# Patient Record
Sex: MALE | Race: WHITE | NOT HISPANIC OR LATINO | Employment: OTHER | URBAN - METROPOLITAN AREA
[De-identification: names, ages, dates, MRNs, and addresses within clinical notes are randomized per-mention and may not be internally consistent; named-entity substitution may affect disease eponyms.]

---

## 2022-12-30 ENCOUNTER — APPOINTMENT (OUTPATIENT)
Dept: CARDIOLOGY | Facility: HOSPITAL | Age: 77
DRG: 246 | End: 2022-12-30
Payer: MEDICARE

## 2022-12-30 ENCOUNTER — APPOINTMENT (OUTPATIENT)
Dept: GENERAL RADIOLOGY | Facility: HOSPITAL | Age: 77
DRG: 246 | End: 2022-12-30
Payer: MEDICARE

## 2022-12-30 ENCOUNTER — HOSPITAL ENCOUNTER (INPATIENT)
Facility: HOSPITAL | Age: 77
LOS: 7 days | Discharge: HOME HEALTH | DRG: 246 | End: 2023-01-06
Attending: INTERNAL MEDICINE | Admitting: INTERNAL MEDICINE
Payer: MEDICARE

## 2022-12-30 DIAGNOSIS — I49.01 VF (VENTRICULAR FIBRILLATION): ICD-10-CM

## 2022-12-30 DIAGNOSIS — I46.9 CARDIAC ARREST: ICD-10-CM

## 2022-12-30 DIAGNOSIS — N17.9 AKI (ACUTE KIDNEY INJURY): ICD-10-CM

## 2022-12-30 DIAGNOSIS — J96.11 CHRONIC HYPOXEMIC RESPIRATORY FAILURE: ICD-10-CM

## 2022-12-30 DIAGNOSIS — I21.19 ACUTE ST ELEVATION MYOCARDIAL INFARCTION (STEMI) OF INFERIOR WALL: ICD-10-CM

## 2022-12-30 DIAGNOSIS — I44.2 COMPLETE HEART BLOCK: ICD-10-CM

## 2022-12-30 DIAGNOSIS — I21.3 ST ELEVATION MYOCARDIAL INFARCTION (STEMI), UNSPECIFIED ARTERY: Primary | ICD-10-CM

## 2022-12-30 DIAGNOSIS — R73.03 PREDIABETES: ICD-10-CM

## 2022-12-30 DIAGNOSIS — Z72.0 TOBACCO USE: ICD-10-CM

## 2022-12-30 DIAGNOSIS — I50.21 ACUTE SYSTOLIC HEART FAILURE: ICD-10-CM

## 2022-12-30 LAB
ACT BLD: 215 SECONDS (ref 82–152)
ACT BLD: 342 SECONDS (ref 82–152)
ALBUMIN SERPL-MCNC: 3.2 G/DL (ref 3.5–5.2)
ALBUMIN/GLOB SERPL: 1 G/DL
ALP SERPL-CCNC: 200 U/L (ref 39–117)
ALT SERPL W P-5'-P-CCNC: 51 U/L (ref 1–41)
ANION GAP SERPL CALCULATED.3IONS-SCNC: 11 MMOL/L (ref 5–15)
AST SERPL-CCNC: 167 U/L (ref 1–40)
BASOPHILS # BLD AUTO: 0.04 10*3/MM3 (ref 0–0.2)
BASOPHILS NFR BLD AUTO: 0.3 % (ref 0–1.5)
BH CV ECHO MEAS - AO MAX PG: 5.2 MMHG
BH CV ECHO MEAS - AO MEAN PG: 2 MMHG
BH CV ECHO MEAS - AO ROOT DIAM: 3.3 CM
BH CV ECHO MEAS - AO V2 MAX: 114 CM/SEC
BH CV ECHO MEAS - AO V2 VTI: 21.9 CM
BH CV ECHO MEAS - AVA(I,D): 1.64 CM2
BH CV ECHO MEAS - EDV(CUBED): 97.3 ML
BH CV ECHO MEAS - EDV(MOD-SP2): 68.3 ML
BH CV ECHO MEAS - EDV(MOD-SP4): 93.4 ML
BH CV ECHO MEAS - EF(MOD-BP): 32.1 %
BH CV ECHO MEAS - EF(MOD-SP2): 29.7 %
BH CV ECHO MEAS - EF(MOD-SP4): 35.1 %
BH CV ECHO MEAS - ESV(CUBED): 54.9 ML
BH CV ECHO MEAS - ESV(MOD-SP2): 48 ML
BH CV ECHO MEAS - ESV(MOD-SP4): 60.6 ML
BH CV ECHO MEAS - FS: 17.4 %
BH CV ECHO MEAS - IVS/LVPW: 1 CM
BH CV ECHO MEAS - IVSD: 0.7 CM
BH CV ECHO MEAS - LA DIMENSION: 3.2 CM
BH CV ECHO MEAS - LAT PEAK E' VEL: 12.6 CM/SEC
BH CV ECHO MEAS - LV MASS(C)D: 99.3 GRAMS
BH CV ECHO MEAS - LV MAX PG: 1.21 MMHG
BH CV ECHO MEAS - LV MEAN PG: 0 MMHG
BH CV ECHO MEAS - LV V1 MAX: 55.1 CM/SEC
BH CV ECHO MEAS - LV V1 VTI: 11.4 CM
BH CV ECHO MEAS - LVIDD: 4.6 CM
BH CV ECHO MEAS - LVIDS: 3.8 CM
BH CV ECHO MEAS - LVOT AREA: 3.1 CM2
BH CV ECHO MEAS - LVOT DIAM: 2 CM
BH CV ECHO MEAS - LVPWD: 0.7 CM
BH CV ECHO MEAS - MED PEAK E' VEL: 7.3 CM/SEC
BH CV ECHO MEAS - MR MAX PG: 26.8 MMHG
BH CV ECHO MEAS - MR MAX VEL: 259 CM/SEC
BH CV ECHO MEAS - MR MEAN PG: 19 MMHG
BH CV ECHO MEAS - MR MEAN VEL: 211 CM/SEC
BH CV ECHO MEAS - MR VTI: 73.1 CM
BH CV ECHO MEAS - MV A MAX VEL: 77.6 CM/SEC
BH CV ECHO MEAS - MV DEC SLOPE: 481 CM/SEC2
BH CV ECHO MEAS - MV DEC TIME: 0.21 MSEC
BH CV ECHO MEAS - MV E MAX VEL: 68.1 CM/SEC
BH CV ECHO MEAS - MV E/A: 0.88
BH CV ECHO MEAS - MV P1/2T: 63.3 MSEC
BH CV ECHO MEAS - MVA(P1/2T): 3.5 CM2
BH CV ECHO MEAS - PA ACC TIME: 0.15 SEC
BH CV ECHO MEAS - PA PR(ACCEL): 11 MMHG
BH CV ECHO MEAS - RAP SYSTOLE: 8 MMHG
BH CV ECHO MEAS - RVSP: 41 MMHG
BH CV ECHO MEAS - SV(LVOT): 35.8 ML
BH CV ECHO MEAS - SV(MOD-SP2): 20.3 ML
BH CV ECHO MEAS - SV(MOD-SP4): 32.8 ML
BH CV ECHO MEAS - TAPSE (>1.6): 1.6 CM
BH CV ECHO MEAS - TR MAX PG: 32.9 MMHG
BH CV ECHO MEAS - TR MAX VEL: 287 CM/SEC
BH CV ECHO MEASUREMENTS AVERAGE E/E' RATIO: 6.84
BH CV VAS BP LEFT ARM: NORMAL MMHG
BH CV XLRA - RV BASE: 4.4 CM
BH CV XLRA - RV LENGTH: 8.1 CM
BH CV XLRA - RV MID: 3.4 CM
BH CV XLRA - TDI S': 10.6 CM/SEC
BH CV XLRA MEAS LEFT DIST CCA EDV: 19.3 CM/SEC
BH CV XLRA MEAS LEFT DIST CCA PSV: 68.3 CM/SEC
BH CV XLRA MEAS LEFT DIST ICA EDV: 34.8 CM/SEC
BH CV XLRA MEAS LEFT DIST ICA PSV: 92.6 CM/SEC
BH CV XLRA MEAS LEFT ICA/CCA RATIO: 1.1
BH CV XLRA MEAS LEFT MID CCA EDV: 20.5 CM/SEC
BH CV XLRA MEAS LEFT MID CCA PSV: 80.1 CM/SEC
BH CV XLRA MEAS LEFT MID ICA EDV: 29.8 CM/SEC
BH CV XLRA MEAS LEFT MID ICA PSV: 88.2 CM/SEC
BH CV XLRA MEAS LEFT PROX CCA EDV: 28.6 CM/SEC
BH CV XLRA MEAS LEFT PROX CCA PSV: 103 CM/SEC
BH CV XLRA MEAS LEFT PROX ECA PSV: 94.4 CM/SEC
BH CV XLRA MEAS LEFT PROX ICA EDV: 14.3 CM/SEC
BH CV XLRA MEAS LEFT PROX ICA PSV: 36 CM/SEC
BH CV XLRA MEAS LEFT PROX SCLA PSV: 100 CM/SEC
BH CV XLRA MEAS LEFT VERTEBRAL A PSV: 33.2 CM/SEC
BH CV XLRA MEAS RIGHT DIST CCA EDV: 24.2 CM/SEC
BH CV XLRA MEAS RIGHT DIST CCA PSV: 67.1 CM/SEC
BH CV XLRA MEAS RIGHT DIST ICA EDV: 29.2 CM/SEC
BH CV XLRA MEAS RIGHT DIST ICA PSV: 73.3 CM/SEC
BH CV XLRA MEAS RIGHT ICA/CCA RATIO: 1.17
BH CV XLRA MEAS RIGHT MID CCA EDV: 20.5 CM/SEC
BH CV XLRA MEAS RIGHT MID CCA PSV: 70.2 CM/SEC
BH CV XLRA MEAS RIGHT MID ICA EDV: 25.5 CM/SEC
BH CV XLRA MEAS RIGHT MID ICA PSV: 82 CM/SEC
BH CV XLRA MEAS RIGHT PROX CCA EDV: 15.5 CM/SEC
BH CV XLRA MEAS RIGHT PROX CCA PSV: 72.7 CM/SEC
BH CV XLRA MEAS RIGHT PROX ECA PSV: 80.8 CM/SEC
BH CV XLRA MEAS RIGHT PROX ICA EDV: 12.4 CM/SEC
BH CV XLRA MEAS RIGHT PROX ICA PSV: 50.9 CM/SEC
BH CV XLRA MEAS RIGHT PROX SCLA PSV: 92.6 CM/SEC
BH CV XLRA MEAS RIGHT VERTEBRAL A PSV: 44.1 CM/SEC
BILIRUB SERPL-MCNC: 0.5 MG/DL (ref 0–1.2)
BUN SERPL-MCNC: 15 MG/DL (ref 8–23)
BUN/CREAT SERPL: 10.7 (ref 7–25)
CALCIUM SPEC-SCNC: 8.5 MG/DL (ref 8.6–10.5)
CATH EF ESTIMATED: 40 %
CHLORIDE SERPL-SCNC: 103 MMOL/L (ref 98–107)
CO2 SERPL-SCNC: 23 MMOL/L (ref 22–29)
CREAT BLDA-MCNC: 1.6 MG/DL (ref 0.6–1.3)
CREAT SERPL-MCNC: 1.4 MG/DL (ref 0.76–1.27)
DEPRECATED RDW RBC AUTO: 52 FL (ref 37–54)
EGFRCR SERPLBLD CKD-EPI 2021: 52.1 ML/MIN/1.73
EOSINOPHIL # BLD AUTO: 0.03 10*3/MM3 (ref 0–0.4)
EOSINOPHIL NFR BLD AUTO: 0.3 % (ref 0.3–6.2)
ERYTHROCYTE [DISTWIDTH] IN BLOOD BY AUTOMATED COUNT: 14.1 % (ref 12.3–15.4)
GLOBULIN UR ELPH-MCNC: 3.2 GM/DL
GLUCOSE BLDC GLUCOMTR-MCNC: 115 MG/DL (ref 70–130)
GLUCOSE BLDC GLUCOMTR-MCNC: 202 MG/DL (ref 70–130)
GLUCOSE SERPL-MCNC: 223 MG/DL (ref 65–99)
HBA1C MFR BLD: 6.1 % (ref 4.8–5.6)
HCT VFR BLD AUTO: 43.7 % (ref 37.5–51)
HGB BLD-MCNC: 13.9 G/DL (ref 13–17.7)
IMM GRANULOCYTES # BLD AUTO: 0.21 10*3/MM3 (ref 0–0.05)
IMM GRANULOCYTES NFR BLD AUTO: 1.8 % (ref 0–0.5)
IVRT: 85 MSEC
LEFT ARM BP: NORMAL MMHG
LEFT ATRIUM VOLUME INDEX: 25.4 ML/M2
LEFT ATRIUM VOLUME: 41.9 ML
LYMPHOCYTES # BLD AUTO: 1.42 10*3/MM3 (ref 0.7–3.1)
LYMPHOCYTES NFR BLD AUTO: 11.9 % (ref 19.6–45.3)
MAGNESIUM SERPL-MCNC: 1.7 MG/DL (ref 1.6–2.4)
MAXIMAL PREDICTED HEART RATE: 144 BPM
MAXIMAL PREDICTED HEART RATE: 144 BPM
MCH RBC QN AUTO: 31.7 PG (ref 26.6–33)
MCHC RBC AUTO-ENTMCNC: 31.8 G/DL (ref 31.5–35.7)
MCV RBC AUTO: 99.5 FL (ref 79–97)
MONOCYTES # BLD AUTO: 0.65 10*3/MM3 (ref 0.1–0.9)
MONOCYTES NFR BLD AUTO: 5.4 % (ref 5–12)
NEUTROPHILS NFR BLD AUTO: 80.3 % (ref 42.7–76)
NEUTROPHILS NFR BLD AUTO: 9.59 10*3/MM3 (ref 1.7–7)
NRBC BLD AUTO-RTO: 0 /100 WBC (ref 0–0.2)
NT-PROBNP SERPL-MCNC: 480.4 PG/ML (ref 0–1800)
PHOSPHATE SERPL-MCNC: 1.4 MG/DL (ref 2.5–4.5)
PLATELET # BLD AUTO: 577 10*3/MM3 (ref 140–450)
PMV BLD AUTO: 9.2 FL (ref 6–12)
POTASSIUM SERPL-SCNC: 4.2 MMOL/L (ref 3.5–5.2)
PROT SERPL-MCNC: 6.4 G/DL (ref 6–8.5)
RBC # BLD AUTO: 4.39 10*6/MM3 (ref 4.14–5.8)
SODIUM SERPL-SCNC: 137 MMOL/L (ref 136–145)
STRESS TARGET HR: 122 BPM
STRESS TARGET HR: 122 BPM
TROPONIN T SERPL-MCNC: 2.1 NG/ML (ref 0–0.03)
TSH SERPL DL<=0.05 MIU/L-ACNC: 1.4 UIU/ML (ref 0.27–4.2)
WBC NRBC COR # BLD: 11.94 10*3/MM3 (ref 3.4–10.8)

## 2022-12-30 PROCEDURE — 83735 ASSAY OF MAGNESIUM: CPT | Performed by: INTERNAL MEDICINE

## 2022-12-30 PROCEDURE — 25010000002 MIDAZOLAM PER 1 MG: Performed by: INTERNAL MEDICINE

## 2022-12-30 PROCEDURE — 25010000002 PHENYLEPHRINE 10 MG/ML SOLUTION: Performed by: INTERNAL MEDICINE

## 2022-12-30 PROCEDURE — 71045 X-RAY EXAM CHEST 1 VIEW: CPT

## 2022-12-30 PROCEDURE — 25010000002 FENTANYL CITRATE (PF) 50 MCG/ML SOLUTION: Performed by: INTERNAL MEDICINE

## 2022-12-30 PROCEDURE — 92978 ENDOLUMINL IVUS OCT C 1ST: CPT | Performed by: INTERNAL MEDICINE

## 2022-12-30 PROCEDURE — 93799 UNLISTED CV SVC/PROCEDURE: CPT | Performed by: INTERNAL MEDICINE

## 2022-12-30 PROCEDURE — 0 IOPAMIDOL PER 1 ML: Performed by: INTERNAL MEDICINE

## 2022-12-30 PROCEDURE — 84443 ASSAY THYROID STIM HORMONE: CPT | Performed by: INTERNAL MEDICINE

## 2022-12-30 PROCEDURE — 0 MAGNESIUM SULFATE 4 GM/100ML SOLUTION: Performed by: NURSE PRACTITIONER

## 2022-12-30 PROCEDURE — C1725 CATH, TRANSLUMIN NON-LASER: HCPCS | Performed by: INTERNAL MEDICINE

## 2022-12-30 PROCEDURE — C9460 INJECTION, CANGRELOR: HCPCS | Performed by: INTERNAL MEDICINE

## 2022-12-30 PROCEDURE — 83880 ASSAY OF NATRIURETIC PEPTIDE: CPT | Performed by: INTERNAL MEDICINE

## 2022-12-30 PROCEDURE — C1769 GUIDE WIRE: HCPCS | Performed by: INTERNAL MEDICINE

## 2022-12-30 PROCEDURE — 5A2204Z RESTORATION OF CARDIAC RHYTHM, SINGLE: ICD-10-PCS | Performed by: INTERNAL MEDICINE

## 2022-12-30 PROCEDURE — 85025 COMPLETE CBC W/AUTO DIFF WBC: CPT | Performed by: INTERNAL MEDICINE

## 2022-12-30 PROCEDURE — 92941 PRQ TRLML REVSC TOT OCCL AMI: CPT | Performed by: INTERNAL MEDICINE

## 2022-12-30 PROCEDURE — 4A023N7 MEASUREMENT OF CARDIAC SAMPLING AND PRESSURE, LEFT HEART, PERCUTANEOUS APPROACH: ICD-10-PCS | Performed by: INTERNAL MEDICINE

## 2022-12-30 PROCEDURE — B2111ZZ FLUOROSCOPY OF MULTIPLE CORONARY ARTERIES USING LOW OSMOLAR CONTRAST: ICD-10-PCS | Performed by: INTERNAL MEDICINE

## 2022-12-30 PROCEDURE — 82565 ASSAY OF CREATININE: CPT

## 2022-12-30 PROCEDURE — 93005 ELECTROCARDIOGRAM TRACING: CPT | Performed by: INTERNAL MEDICINE

## 2022-12-30 PROCEDURE — 25010000002 AMIODARONE IN DEXTROSE 5% 150-4.21 MG/100ML-% SOLUTION: Performed by: INTERNAL MEDICINE

## 2022-12-30 PROCEDURE — C9606 PERC D-E COR REVASC W AMI S: HCPCS | Performed by: INTERNAL MEDICINE

## 2022-12-30 PROCEDURE — C1874 STENT, COATED/COV W/DEL SYS: HCPCS | Performed by: INTERNAL MEDICINE

## 2022-12-30 PROCEDURE — B240ZZ3 ULTRASONOGRAPHY OF SINGLE CORONARY ARTERY, INTRAVASCULAR: ICD-10-PCS | Performed by: INTERNAL MEDICINE

## 2022-12-30 PROCEDURE — 84484 ASSAY OF TROPONIN QUANT: CPT | Performed by: INTERNAL MEDICINE

## 2022-12-30 PROCEDURE — 93005 ELECTROCARDIOGRAM TRACING: CPT | Performed by: NURSE PRACTITIONER

## 2022-12-30 PROCEDURE — B2151ZZ FLUOROSCOPY OF LEFT HEART USING LOW OSMOLAR CONTRAST: ICD-10-PCS | Performed by: INTERNAL MEDICINE

## 2022-12-30 PROCEDURE — 82962 GLUCOSE BLOOD TEST: CPT

## 2022-12-30 PROCEDURE — 93458 L HRT ARTERY/VENTRICLE ANGIO: CPT | Performed by: INTERNAL MEDICINE

## 2022-12-30 PROCEDURE — 93880 EXTRACRANIAL BILAT STUDY: CPT

## 2022-12-30 PROCEDURE — 25010000002 HEPARIN (PORCINE) PER 1000 UNITS: Performed by: INTERNAL MEDICINE

## 2022-12-30 PROCEDURE — 99232 SBSQ HOSP IP/OBS MODERATE 35: CPT | Performed by: NURSE PRACTITIONER

## 2022-12-30 PROCEDURE — 80053 COMPREHEN METABOLIC PANEL: CPT | Performed by: INTERNAL MEDICINE

## 2022-12-30 PROCEDURE — 027034Z DILATION OF CORONARY ARTERY, ONE ARTERY WITH DRUG-ELUTING INTRALUMINAL DEVICE, PERCUTANEOUS APPROACH: ICD-10-PCS | Performed by: INTERNAL MEDICINE

## 2022-12-30 PROCEDURE — C1753 CATH, INTRAVAS ULTRASOUND: HCPCS | Performed by: INTERNAL MEDICINE

## 2022-12-30 PROCEDURE — 93010 ELECTROCARDIOGRAM REPORT: CPT | Performed by: INTERNAL MEDICINE

## 2022-12-30 PROCEDURE — 99223 1ST HOSP IP/OBS HIGH 75: CPT | Performed by: INTERNAL MEDICINE

## 2022-12-30 PROCEDURE — C1887 CATHETER, GUIDING: HCPCS | Performed by: INTERNAL MEDICINE

## 2022-12-30 PROCEDURE — 25010000002 DOPAMINE PER 40 MG: Performed by: INTERNAL MEDICINE

## 2022-12-30 PROCEDURE — 93306 TTE W/DOPPLER COMPLETE: CPT

## 2022-12-30 PROCEDURE — 93306 TTE W/DOPPLER COMPLETE: CPT | Performed by: INTERNAL MEDICINE

## 2022-12-30 PROCEDURE — 93571 IV DOP VEL&/PRESS C FLO 1ST: CPT | Performed by: INTERNAL MEDICINE

## 2022-12-30 PROCEDURE — 85347 COAGULATION TIME ACTIVATED: CPT

## 2022-12-30 PROCEDURE — 25010000002 CANGRELOR TETRASODIUM 50 MG RECONSTITUTED SOLUTION 1 EACH VIAL: Performed by: INTERNAL MEDICINE

## 2022-12-30 PROCEDURE — C1894 INTRO/SHEATH, NON-LASER: HCPCS | Performed by: INTERNAL MEDICINE

## 2022-12-30 PROCEDURE — 84100 ASSAY OF PHOSPHORUS: CPT | Performed by: NURSE PRACTITIONER

## 2022-12-30 PROCEDURE — 93880 EXTRACRANIAL BILAT STUDY: CPT | Performed by: INTERNAL MEDICINE

## 2022-12-30 PROCEDURE — 83036 HEMOGLOBIN GLYCOSYLATED A1C: CPT | Performed by: NURSE PRACTITIONER

## 2022-12-30 PROCEDURE — 25010000002 AMIODARONE IN DEXTROSE 5% 360-4.14 MG/200ML-% SOLUTION: Performed by: INTERNAL MEDICINE

## 2022-12-30 DEVICE — XIENCE SKYPOINT™ EVEROLIMUS ELUTING CORONARY STENT SYSTEM 3.50 MM X 38 MM / RAPID-EXCHANGE
Type: IMPLANTABLE DEVICE | Status: FUNCTIONAL
Brand: XIENCE SKYPOINT™

## 2022-12-30 RX ORDER — HEPARIN SODIUM 1000 [USP'U]/ML
INJECTION, SOLUTION INTRAVENOUS; SUBCUTANEOUS
Status: DISCONTINUED | OUTPATIENT
Start: 2022-12-30 | End: 2022-12-30 | Stop reason: HOSPADM

## 2022-12-30 RX ORDER — ASPIRIN 81 MG/1
81 TABLET ORAL DAILY
Status: DISCONTINUED | OUTPATIENT
Start: 2022-12-31 | End: 2023-01-06 | Stop reason: HOSPADM

## 2022-12-30 RX ORDER — NICOTINE POLACRILEX 4 MG
15 LOZENGE BUCCAL
Status: DISCONTINUED | OUTPATIENT
Start: 2022-12-30 | End: 2023-01-05

## 2022-12-30 RX ORDER — DOPAMINE HYDROCHLORIDE 160 MG/100ML
INJECTION, SOLUTION INTRAVENOUS
Status: COMPLETED | OUTPATIENT
Start: 2022-12-30 | End: 2022-12-30

## 2022-12-30 RX ORDER — MIDAZOLAM HYDROCHLORIDE 1 MG/ML
INJECTION INTRAMUSCULAR; INTRAVENOUS
Status: DISCONTINUED | OUTPATIENT
Start: 2022-12-30 | End: 2022-12-30 | Stop reason: HOSPADM

## 2022-12-30 RX ORDER — MAGNESIUM SULFATE HEPTAHYDRATE 40 MG/ML
2 INJECTION, SOLUTION INTRAVENOUS AS NEEDED
Status: DISCONTINUED | OUTPATIENT
Start: 2022-12-30 | End: 2023-01-06 | Stop reason: HOSPADM

## 2022-12-30 RX ORDER — PHENYLEPHRINE HYDROCHLORIDE 10 MG/ML
INJECTION INTRAVENOUS
Status: DISCONTINUED | OUTPATIENT
Start: 2022-12-30 | End: 2022-12-30 | Stop reason: HOSPADM

## 2022-12-30 RX ORDER — ROSUVASTATIN CALCIUM 20 MG/1
20 TABLET, COATED ORAL NIGHTLY
Status: DISCONTINUED | OUTPATIENT
Start: 2022-12-30 | End: 2023-01-06 | Stop reason: HOSPADM

## 2022-12-30 RX ORDER — NICARDIPINE HCL-0.9% SOD CHLOR 1 MG/10 ML
SYRINGE (ML) INTRAVENOUS
Status: DISCONTINUED | OUTPATIENT
Start: 2022-12-30 | End: 2022-12-30 | Stop reason: HOSPADM

## 2022-12-30 RX ORDER — INSULIN LISPRO 100 [IU]/ML
0-9 INJECTION, SOLUTION INTRAVENOUS; SUBCUTANEOUS
Status: DISCONTINUED | OUTPATIENT
Start: 2022-12-30 | End: 2023-01-05

## 2022-12-30 RX ORDER — DEXTROSE MONOHYDRATE 25 G/50ML
25 INJECTION, SOLUTION INTRAVENOUS
Status: DISCONTINUED | OUTPATIENT
Start: 2022-12-30 | End: 2023-01-05

## 2022-12-30 RX ORDER — FENTANYL CITRATE 50 UG/ML
INJECTION, SOLUTION INTRAMUSCULAR; INTRAVENOUS
Status: DISCONTINUED | OUTPATIENT
Start: 2022-12-30 | End: 2022-12-30 | Stop reason: HOSPADM

## 2022-12-30 RX ORDER — MAGNESIUM SULFATE HEPTAHYDRATE 40 MG/ML
4 INJECTION, SOLUTION INTRAVENOUS AS NEEDED
Status: DISCONTINUED | OUTPATIENT
Start: 2022-12-30 | End: 2023-01-06 | Stop reason: HOSPADM

## 2022-12-30 RX ORDER — ONDANSETRON 2 MG/ML
4 INJECTION INTRAMUSCULAR; INTRAVENOUS EVERY 6 HOURS PRN
Status: DISCONTINUED | OUTPATIENT
Start: 2022-12-30 | End: 2023-01-06 | Stop reason: HOSPADM

## 2022-12-30 RX ORDER — ACETAMINOPHEN 325 MG/1
650 TABLET ORAL EVERY 4 HOURS PRN
Status: DISCONTINUED | OUTPATIENT
Start: 2022-12-30 | End: 2023-01-06 | Stop reason: HOSPADM

## 2022-12-30 RX ORDER — LIDOCAINE HYDROCHLORIDE 10 MG/ML
INJECTION, SOLUTION EPIDURAL; INFILTRATION; INTRACAUDAL; PERINEURAL
Status: DISCONTINUED | OUTPATIENT
Start: 2022-12-30 | End: 2022-12-30 | Stop reason: HOSPADM

## 2022-12-30 RX ORDER — NICOTINE 21 MG/24HR
1 PATCH, TRANSDERMAL 24 HOURS TRANSDERMAL
Status: DISCONTINUED | OUTPATIENT
Start: 2022-12-30 | End: 2023-01-06 | Stop reason: HOSPADM

## 2022-12-30 RX ORDER — SODIUM CHLORIDE 9 MG/ML
1 INJECTION, SOLUTION INTRAVENOUS CONTINUOUS
Status: ACTIVE | OUTPATIENT
Start: 2022-12-30 | End: 2022-12-30

## 2022-12-30 RX ORDER — PRASUGREL 10 MG/1
60 TABLET, FILM COATED ORAL ONCE
Status: COMPLETED | OUTPATIENT
Start: 2022-12-30 | End: 2022-12-30

## 2022-12-30 RX ORDER — DOPAMINE HYDROCHLORIDE 160 MG/100ML
2-20 INJECTION, SOLUTION INTRAVENOUS
Status: DISCONTINUED | OUTPATIENT
Start: 2022-12-30 | End: 2023-01-04

## 2022-12-30 RX ORDER — PANTOPRAZOLE SODIUM 40 MG/1
40 TABLET, DELAYED RELEASE ORAL
Status: DISCONTINUED | OUTPATIENT
Start: 2022-12-31 | End: 2023-01-06 | Stop reason: HOSPADM

## 2022-12-30 RX ORDER — PRASUGREL 10 MG/1
5 TABLET, FILM COATED ORAL DAILY
Status: DISCONTINUED | OUTPATIENT
Start: 2022-12-31 | End: 2023-01-06 | Stop reason: HOSPADM

## 2022-12-30 RX ADMIN — SODIUM CHLORIDE 1 ML/KG/HR: 9 INJECTION, SOLUTION INTRAVENOUS at 16:05

## 2022-12-30 RX ADMIN — Medication 1 PATCH: at 20:14

## 2022-12-30 RX ADMIN — MAGNESIUM SULFATE HEPTAHYDRATE 4 G: 40 INJECTION, SOLUTION INTRAVENOUS at 20:14

## 2022-12-30 RX ADMIN — AMIODARONE HYDROCHLORIDE 1 MG/MIN: 1.8 INJECTION, SOLUTION INTRAVENOUS at 19:31

## 2022-12-30 RX ADMIN — AMIODARONE HYDROCHLORIDE 0.5 MG/MIN: 1.8 INJECTION, SOLUTION INTRAVENOUS at 22:06

## 2022-12-30 RX ADMIN — ROSUVASTATIN 20 MG: 20 TABLET, FILM COATED ORAL at 20:15

## 2022-12-30 RX ADMIN — POTASSIUM & SODIUM PHOSPHATES POWDER PACK 280-160-250 MG 2 PACKET: 280-160-250 PACK at 20:14

## 2022-12-30 RX ADMIN — PRASUGREL 60 MG: 10 TABLET, FILM COATED ORAL at 16:59

## 2022-12-30 NOTE — Clinical Note
First balloon inflation max pressure = 15 shantanu. First balloon inflation duration = 10 seconds. Second inflation of balloon - Max pressure = 16 shantanu. 2nd Inflation of balloon - Duration = 5 seconds.

## 2022-12-30 NOTE — Clinical Note
First balloon inflation max pressure = 18 shantanu. First balloon inflation duration = 10 seconds. Second inflation of balloon - Max pressure = 18 shantanu. 2nd Inflation of balloon - Duration = 10 seconds. Third inflation of balloon - Max pressure = 15 shantanu. 3rd Inflation of balloon - Duration = 10 seconds.

## 2022-12-30 NOTE — Clinical Note
Suture was used to secure the sheath post procedure. Transparent Dressing was used to secure the sheath post procedure.  Sheath Left Intact after the procedure.

## 2022-12-30 NOTE — PROGRESS NOTES
"Intensive Care Follow-up     Hospital:  LOS: 0 days   Mr. Tucker Ambrosio, 77 y.o. male is followed for:   Acute ST elevation myocardial infarction (STEMI) of inferior wall (HCC)          History of present illness:   Tucker Ambrosio is a 77 y.o. male with PMH COPD, 2PPD smoker and GERD who presented .  He was diagnosed with the Flu about a month ago and has had worsening breathing problems since then.  He has been wearing 2L NC Home O2 since he was diagnosed.  He reports that he has been unable to lie flat and has been sleeping on many pillows.  Daughter states that he was living independently at baseline one month ago.  He is a heavy smoker and states that he smokes about 2 PPD (\"I buy a pack in the morning that lasts me all day and then buy another pack before they close to last all night\").  However, he hasn't had a cigarette for about a month.  Daughter is a Nurse Practitioner at [employer].      Today, a hospital bed was supposed to be delivered to help him sleep better with his head elevated.  He states that he was feeling poorly today with some chest pain, but mostly profuse sweating.  He got short of air and called his sister.  When she came over, she called EMS.  Daughter states that EMS found him in PEA arrest and started CPR.  Reportedly, ROSC was achieved after about 3 minutes.  Patient states that he remembers \"a big toñito over top of me and I told him he was hurting me.\"  EKG revealed inferior STEMI and was brought to [facility] where he emergently went to the cath lab.  He underwent LHC by Dr. Reddy who placed a LOGAN to RCA.  EF was 40%.  Intraprocedurally, he had V Fib with successful defibrillation x1 and conversion to NSR.  He also had complete heart block requiring temporary venous pacer during the heart cath.  It was discontinued prior to transfer to the ICU.  He was transferred to the ICU on Dopamine, Amiodarone and Cangrelor.  He will be loaded with Effient.      Time spent: 20 minutes  Electronically " "signed by Elizabeth Cowan, CAPRI, 12/30/22, 6:47 PM EST.    Subjective   Interval History:  Patient resting in bed while visiting with his daughter.  He is pleasant, funny and conversant.  States \"I can't believe I croaked today.\"  States that he has a little shortness of air, but overall feels much better.  He is in SB on 5 mcg/kg/min Dopamine, 1 mg/min Amiodarone and Cangrelor.         The patient's past medical, surgical and social history were reviewed and updated in Epic as appropriate.    Review of Systems -   General ROS: negative for - chills, fatigue, fever, hot flashes or night sweats  Respiratory ROS: no cough, shortness of breath, or wheezing  Cardiovascular ROS: positive for - shortness of breath  Gastrointestinal ROS: no abdominal pain, change in bowel habits, or black or bloody stools       Objective     Infusions:  amiodarone, 1 mg/min, Last Rate: 1 mg/min (12/30/22 1604)   Followed by  amiodarone, 0.5 mg/min  DOPamine, 2-20 mcg/kg/min, Last Rate: 3 mcg/kg/min (12/30/22 1815)  sodium chloride, 1 mL/kg/hr, Last Rate: 1 mL/kg/hr (12/30/22 1605)      Medications:  amiodarone, 150 mg, Intravenous, Once  [START ON 12/31/2022] aspirin, 81 mg, Oral, Daily  [START ON 12/31/2022] prasugrel, 5 mg, Oral, Daily  rosuvastatin, 20 mg, Oral, Nightly        Vital Sign Min/Max for last 24 hours  No data recorded   BP  Min: 112/66  Max: 118/71   Pulse  Min: 44  Max: 95   Resp  Min: 20  Max: 20   SpO2  Min: 90 %  Max: 97 %   Flow (L/min)  Min: 4  Max: 4       Input/Output for last 24 hour shift  No intake/output data recorded.      Objective:  General Appearance:  In no acute distress.    Vital signs: (most recent): Blood pressure 112/66, pulse 95, resp. rate 20, height 165.1 cm (65\"), weight 59 kg (130 lb), SpO2 90 %.  Vital signs are normal.    HEENT: Normal HEENT exam.    Lungs:  Normal effort and normal respiratory rate.  Breath sounds clear to auscultation.  He is not in respiratory distress.  No " rales, wheezes or rhonchi.    Heart: Bradycardia.  Regular rhythm.  S1 normal and S2 normal.  No murmur or friction rub.   Abdomen: Abdomen is soft and non-distended.  Hypoactive bowel sounds.   There is no abdominal tenderness.     Extremities: There is no dependent edema.    Pulses: Distal pulses are intact.    Neurological: Patient is alert and oriented to person, place and time.  GCS score is 15.    Pupils:  Pupils are equal, round, and reactive to light.    Skin:  Warm and dry.  No rash.             Results from last 7 days   Lab Units 12/30/22  1608   WBC 10*3/mm3 11.94*   HEMOGLOBIN g/dL 13.9   PLATELETS 10*3/mm3 577*     Results from last 7 days   Lab Units 12/30/22  1609 12/30/22  1608 12/30/22  1415   SODIUM mmol/L  --  137  --    POTASSIUM mmol/L  --  4.2  --    CO2 mmol/L  --  23.0  --    BUN mg/dL  --  15  --    CREATININE mg/dL  --  1.40* 1.60*   MAGNESIUM mg/dL  --  1.7  --    PHOSPHORUS mg/dL 1.4*  --   --    GLUCOSE mg/dL  --  223*  --      Estimated Creatinine Clearance: 37.5 mL/min (A) (by C-G formula based on SCr of 1.4 mg/dL (H)).          Images:     CXR pending    I reviewed the patient's results and images.     Assessment & Plan   Impression        Acute STEMI of inferior wall s/p LOGAN to RCA by Dr. Reddy 12/30/22    PEA arrest (HCC)    Acute systolic heart failure (HCC)    Intraprocedural VF (ventricular fibrillation) (HCC)    Intraprocedural Complete heart block (HCC)    Tobacco use    COPD (chronic obstructive pulmonary disease) (HCC)    Chronic hypoxemic respiratory failure (HCC)    Prediabetes    RICKIE (acute kidney injury) on probable CKD    GERD (gastroesophageal reflux disease)       Plan        Acute Inferior Wall STEMI s/p LOGAN to RCA by Dr. Reddy 12/30/22  PEA arrest in the field  HFrEF  Intraprocedural VF s/p successful defibrillation x1 with conversion to NSR  Intraprocedural CHB s/p short episode of transvenous pacing  · Post procedural care per Cards  · Continue DAPT  · TTE ordered  per Cards  · Dopamine and Amiodarone drips per Cards  · Continue statin  · Start Metoprolol when off vasopressors    COPD  Tobacco Use  Chronic Hypoxic Respiratory Failure  Recent Influenza about one month ago  · Wean O2 for sat > 90%  · Smoking cessation education  · Nicotine patches  · PCXR pending, follow up    Electrolyte Abnormalities  · Replace Phos  · Replace Mag    RICKIE  · Monitor renal function  · AM labs    Prediabetes, Hgb A1C 6.1  · Accuchecks ACHS  · Diabetic/cardiac diet  · Start correction SSI  · Diabetes education    GERD   · Add PPI    DVT prophylaxis:    GI prophylaxis:  PPI  Disposition:  Keep in ICU    Plan of care and goals reviewed with multidisciplinary/antibiotic stewardship team during rounds.   I discussed the patient's findings and my recommendations with patient, family, nursing staff and consulting provider     Time: 35 minutes, face to face, with the patient and family or on the goldberg coordinating care with other health care providers.     I spent > 50% percent of this time, counseling and discussing evaluation, current status and management.      Elizabeth Cowan APRN, AGACNP-BC, FNP-BC  Pulmonary and Critical Care Service

## 2022-12-30 NOTE — Clinical Note
First balloon inflation max pressure = 12 shantanu. First balloon inflation duration = 10 seconds. Second inflation of balloon - Max pressure = 12 shantanu. 2nd Inflation of balloon - Duration = 10 seconds.

## 2022-12-30 NOTE — Clinical Note
Emergency staff delivered patient to lab.  Consents and History and Physical not obtained due to patient condition.

## 2022-12-30 NOTE — Clinical Note
Unable to review allergies. . Patient does not have a current H&P in the chart. Procedural consent has not been signed. Blood consent has not been signed.

## 2022-12-30 NOTE — H&P
[facility] Card[facility]   [address], [address]  [place], [place], [address]    [phone]  [email or web address]           INPATIENT HISTORY AND PHYSICAL NOTE    Chief complaint: Chest pain         HPI:    Tucker Ambrosio is a 77 y.o. male.    Cardiac focused problem list:  1. Chest pain syndrome  a. status post possible PEA arrest in the field, ROSC in 3 minutes  b. Inferior STEMI  2. Recent admission at OSH for flu, acute respiratory failure  3. GERD  4. Chronic back pain  5. Skin cancer  6. Former tobacco dependence, 1 pack/day for 60 years, quit 11/2022, using nicotine patches  7. Surgical history: Appendectomy    The patient had a syncopal episode.  Family/bystanders started CPR.  EMS arrived.  Patient had possibly a total of around 3 minutes of CPR and ROSC was restored.  No defibrillation on the field.  EMS EKG revealed and ST elevation in the inferior leads.  Patient brought urgently to [facility] for heart cath.    Worsening shortness of air for the last 6 months.  Particularly worse for the last few months.  Unable to walk from one end of the apartment to another.  Chest pain started today.    Review of Systems:  Positive for chest pain, distress  All other systems reviewed and are negative.    PFSH:  There is no problem list on file for this patient.      No current facility-administered medications on file prior to encounter.     No current outpatient medications on file prior to encounter.     Not on File       No family history on file.         Objective:     Vital Sign Min/Max for last 24 hours  No data recorded   BP  Min: 112/66  Max: 118/71   Pulse  Min: 44  Max: 95   Resp  Min: 20  Max: 20   SpO2  Min: 90 %  Max: 97 %   No data recorded    No intake or output data in the 24 hours ending 12/30/22 1533        Vitals:    12/30/22 1511   BP: 112/66   Pulse: 95   Resp: 20   SpO2: 90%       CONSTITUTIONAL: In distress  SKIN: Warm and dry. No rashes noted  HEENT:  Head is normocephalic and atraumatic. Mucous membranes are pink and moist.   NECK: Supple without masses or thyromegaly.   LUNGS: Normal effort.  Bilateral rhonchi  CARDIOVASCULAR: Regular rate and rhythm with a normal S1 and S2.  Soft systolic murmur  PERIPHERAL VASCULAR: Carotid upstroke, possible bruits versus radiating murmur. Radial pulses are 2+ bilaterally. There is no lower extremity edema.  2+ DP pulses bilaterally  ABDOMEN: Normal bowel sounds.  Soft with no tenderness with palpitation. No hepatosplenomegaly  MUSCULOSKELETAL:  No digital cyanosis  NEUROLOGICAL: Nonfocal.  PSYCHIATRIC: Alert, orientated    Lab results reviewed by myself:  No results found for: CKTOTAL, CKMB, CKMBINDEX, TROPONINI, TROPONINT    No results found for: CHOL  No results found for: TRIG  No results found for: HDL  No results found for: LDL  No components found for: LDLDIRECTC    Diagnostic Data:    EKG: Sinus bradycardia with ST elevation in the inferior leads    Tele: Sinus rhythm with PVCs         Assessment and Plan:     Problem list:    * No active hospital problems. *      ASSESSMENT:  1. Inferior STEMI, CAD  a. Status post LOGAN x1 to 100% RCA  2. Complete heart block  a. Status post temporary pacemaker wire during heart cath.  Removed prior to transfer to ICU  3. Intraprocedural ventricular fibrillation  a. Status post defibrillation x1 intra procedurally, conversion to sinus rhythm  4. Acute systolic heart failure  a. EF 45% with inferior hypokinesis  5. Recent influenza    PLAN:  1. Admission to intensive care  2. Transthoracic echo, EKG, chest x-ray, labs  3. Continue cangrelor till 5 PM.  4. Load with Effient 60 mg after cangrelor discontinuation at 5 PM, maintenance dose of 5 mg daily (age greater than 75)  5. High intensity statin  6. Beta-blocker when blood pressure allows and off of vasopressors  7. Wean vasopressors to maintain a MAP greater than 65 mmHg  8. Remove venous line when no longer needed for IV  infusions    Kieran Reddy MD, MSc, FACC, [facility]  Interventional Cardiology  [facility]

## 2022-12-31 LAB
ACT BLD: 119 SECONDS (ref 82–152)
ANION GAP SERPL CALCULATED.3IONS-SCNC: 12 MMOL/L (ref 5–15)
BUN SERPL-MCNC: 15 MG/DL (ref 8–23)
BUN/CREAT SERPL: 13.4 (ref 7–25)
CALCIUM SPEC-SCNC: 8.3 MG/DL (ref 8.6–10.5)
CHLORIDE SERPL-SCNC: 102 MMOL/L (ref 98–107)
CHOLEST SERPL-MCNC: 134 MG/DL (ref 0–200)
CO2 SERPL-SCNC: 23 MMOL/L (ref 22–29)
CREAT SERPL-MCNC: 1.12 MG/DL (ref 0.76–1.27)
DEPRECATED RDW RBC AUTO: 52.6 FL (ref 37–54)
EGFRCR SERPLBLD CKD-EPI 2021: 68.1 ML/MIN/1.73
ERYTHROCYTE [DISTWIDTH] IN BLOOD BY AUTOMATED COUNT: 14.5 % (ref 12.3–15.4)
GLUCOSE BLDC GLUCOMTR-MCNC: 108 MG/DL (ref 70–130)
GLUCOSE BLDC GLUCOMTR-MCNC: 117 MG/DL (ref 70–130)
GLUCOSE BLDC GLUCOMTR-MCNC: 118 MG/DL (ref 70–130)
GLUCOSE BLDC GLUCOMTR-MCNC: 156 MG/DL (ref 70–130)
GLUCOSE SERPL-MCNC: 160 MG/DL (ref 65–99)
HCT VFR BLD AUTO: 43 % (ref 37.5–51)
HDLC SERPL-MCNC: 36 MG/DL (ref 40–60)
HGB BLD-MCNC: 13.9 G/DL (ref 13–17.7)
LDLC SERPL CALC-MCNC: 84 MG/DL (ref 0–100)
LDLC/HDLC SERPL: 2.34 {RATIO}
MAGNESIUM SERPL-MCNC: 3.3 MG/DL (ref 1.6–2.4)
MCH RBC QN AUTO: 31.8 PG (ref 26.6–33)
MCHC RBC AUTO-ENTMCNC: 32.3 G/DL (ref 31.5–35.7)
MCV RBC AUTO: 98.4 FL (ref 79–97)
PHOSPHATE SERPL-MCNC: 3.6 MG/DL (ref 2.5–4.5)
PLATELET # BLD AUTO: 508 10*3/MM3 (ref 140–450)
PMV BLD AUTO: 9.5 FL (ref 6–12)
POTASSIUM SERPL-SCNC: 4.4 MMOL/L (ref 3.5–5.2)
RBC # BLD AUTO: 4.37 10*6/MM3 (ref 4.14–5.8)
SODIUM SERPL-SCNC: 137 MMOL/L (ref 136–145)
TRIGL SERPL-MCNC: 68 MG/DL (ref 0–150)
TROPONIN T SERPL-MCNC: 8.17 NG/ML (ref 0–0.03)
VLDLC SERPL-MCNC: 14 MG/DL (ref 5–40)
WBC NRBC COR # BLD: 11.49 10*3/MM3 (ref 3.4–10.8)

## 2022-12-31 PROCEDURE — 85347 COAGULATION TIME ACTIVATED: CPT

## 2022-12-31 PROCEDURE — 85027 COMPLETE CBC AUTOMATED: CPT | Performed by: INTERNAL MEDICINE

## 2022-12-31 PROCEDURE — 84100 ASSAY OF PHOSPHORUS: CPT | Performed by: NURSE PRACTITIONER

## 2022-12-31 PROCEDURE — 82962 GLUCOSE BLOOD TEST: CPT

## 2022-12-31 PROCEDURE — 25010000002 ONDANSETRON PER 1 MG: Performed by: NURSE PRACTITIONER

## 2022-12-31 PROCEDURE — 83735 ASSAY OF MAGNESIUM: CPT | Performed by: NURSE PRACTITIONER

## 2022-12-31 PROCEDURE — 99233 SBSQ HOSP IP/OBS HIGH 50: CPT | Performed by: INTERNAL MEDICINE

## 2022-12-31 PROCEDURE — 93010 ELECTROCARDIOGRAM REPORT: CPT | Performed by: INTERNAL MEDICINE

## 2022-12-31 PROCEDURE — 80061 LIPID PANEL: CPT | Performed by: INTERNAL MEDICINE

## 2022-12-31 PROCEDURE — 80048 BASIC METABOLIC PNL TOTAL CA: CPT | Performed by: INTERNAL MEDICINE

## 2022-12-31 PROCEDURE — 93005 ELECTROCARDIOGRAM TRACING: CPT | Performed by: INTERNAL MEDICINE

## 2022-12-31 PROCEDURE — 84484 ASSAY OF TROPONIN QUANT: CPT | Performed by: INTERNAL MEDICINE

## 2022-12-31 PROCEDURE — 63710000001 INSULIN LISPRO (HUMAN) PER 5 UNITS: Performed by: NURSE PRACTITIONER

## 2022-12-31 PROCEDURE — 25010000002 AMIODARONE IN DEXTROSE 5% 360-4.14 MG/200ML-% SOLUTION: Performed by: INTERNAL MEDICINE

## 2022-12-31 PROCEDURE — 25010000002 DOPAMINE PER 40 MG: Performed by: INTERNAL MEDICINE

## 2022-12-31 PROCEDURE — 99232 SBSQ HOSP IP/OBS MODERATE 35: CPT | Performed by: INTERNAL MEDICINE

## 2022-12-31 RX ORDER — CHOLECALCIFEROL (VITAMIN D3) 125 MCG
5 CAPSULE ORAL NIGHTLY PRN
Status: DISCONTINUED | OUTPATIENT
Start: 2022-12-31 | End: 2023-01-06 | Stop reason: HOSPADM

## 2022-12-31 RX ORDER — IPRATROPIUM BROMIDE AND ALBUTEROL SULFATE 2.5; .5 MG/3ML; MG/3ML
3 SOLUTION RESPIRATORY (INHALATION) EVERY 6 HOURS PRN
Status: DISCONTINUED | OUTPATIENT
Start: 2022-12-31 | End: 2023-01-06 | Stop reason: HOSPADM

## 2022-12-31 RX ADMIN — AMIODARONE HYDROCHLORIDE 0.5 MG/MIN: 1.8 INJECTION, SOLUTION INTRAVENOUS at 04:30

## 2022-12-31 RX ADMIN — Medication 1 PATCH: at 08:36

## 2022-12-31 RX ADMIN — ROSUVASTATIN 20 MG: 20 TABLET, FILM COATED ORAL at 21:05

## 2022-12-31 RX ADMIN — ONDANSETRON 4 MG: 2 INJECTION INTRAMUSCULAR; INTRAVENOUS at 00:15

## 2022-12-31 RX ADMIN — PRASUGREL 5 MG: 10 TABLET, FILM COATED ORAL at 08:34

## 2022-12-31 RX ADMIN — INSULIN LISPRO 2 UNITS: 100 INJECTION, SOLUTION INTRAVENOUS; SUBCUTANEOUS at 08:34

## 2022-12-31 RX ADMIN — ASPIRIN 81 MG: 81 TABLET, COATED ORAL at 08:34

## 2022-12-31 RX ADMIN — DOPAMINE HYDROCHLORIDE 5.5 MCG/KG/MIN: 160 INJECTION, SOLUTION INTRAVENOUS at 16:13

## 2022-12-31 RX ADMIN — PANTOPRAZOLE SODIUM 40 MG: 40 TABLET, DELAYED RELEASE ORAL at 06:48

## 2022-12-31 NOTE — PROGRESS NOTES
"  [facility] at [facility]   Inpatient Progress Note       LOS: 1 day   No care team member to display    Chief Complaint: STEMI/VF    Subjective     Interval History:     Patient awake alert.  Overall feels good in good spirits.  Quit smoking 1 month ago.  No further chest pain    Review of Systems:   Pertinent positives noted in history, exam, and assessment. Otherwise reviewed and negative.      Objective     Vitals:  Blood pressure 96/60, pulse 56, temperature 97.4 °F (36.3 °C), temperature source Oral, resp. rate 16, height 165.1 cm (65\"), weight 59 kg (130 lb 1.1 oz), SpO2 97 %.     Intake/Output Summary (Last 24 hours) at 12/31/2022 1139  Last data filed at 12/31/2022 0800  Gross per 24 hour   Intake 637 ml   Output 750 ml   Net -113 ml     Vitals reviewed.   Constitutional:       Appearance: Well-developed and not in distress.   Neck:      Thyroid: No thyromegaly.      Vascular: No carotid bruit or JVD.   Pulmonary:      Breath sounds: Normal breath sounds.   Cardiovascular:      Regular rhythm.      No gallop. No S3 and S4 gallop.   Edema:     Peripheral edema absent.   Abdominal:      General: Bowel sounds are normal.      Palpations: Abdomen is soft. There is no abdominal mass.      Tenderness: There is no abdominal tenderness.   Musculoskeletal:         General: No deformity.      Extremities: No clubbing present.     Comments: RFA site benign. Skin:     General: Skin is warm and dry.      Findings: No rash.   Neurological:      Mental Status: Alert and oriented to person, place, and time.            Results Review:     I reviewed the patient's new clinical results.    Results from last 7 days   Lab Units 12/31/22  0300   WBC 10*3/mm3 11.49*   HEMOGLOBIN g/dL 13.9   HEMATOCRIT % 43.0   PLATELETS 10*3/mm3 508*     Results from last 7 days   Lab Units 12/31/22  0302 12/30/22  1608   SODIUM mmol/L 137 137   POTASSIUM mmol/L 4.4 4.2   CHLORIDE mmol/L 102 103   CO2 mmol/L 23.0 23.0   BUN mg/dL " 15 15   CREATININE mg/dL 1.12 1.40*   CALCIUM mg/dL 8.3* 8.5*   BILIRUBIN mg/dL  --  0.5   ALK PHOS U/L  --  200*   ALT (SGPT) U/L  --  51*   AST (SGOT) U/L  --  167*   GLUCOSE mg/dL 160* 223*     Results from last 7 days   Lab Units 22  0302   SODIUM mmol/L 137   POTASSIUM mmol/L 4.4   CHLORIDE mmol/L 102   CO2 mmol/L 23.0   BUN mg/dL 15   CREATININE mg/dL 1.12   GLUCOSE mg/dL 160*   CALCIUM mg/dL 8.3*         Lab Results   Lab Value Date/Time    TROPONINT 8.170 (C) 2022 0302    TROPONINT 2.100 (C) 2022 1608     Results from last 7 days   Lab Units 22  1608   TSH uIU/mL 1.400     Results from last 7 days   Lab Units 22  0302   CHOLESTEROL mg/dL 134   TRIGLYCERIDES mg/dL 68   HDL CHOL mg/dL 36*   LDL CHOL mg/dL 84     Results from last 7 days   Lab Units 22  1608   PROBNP pg/mL 480.4     Results from last 7 days   Lab Units 22  0302 22  1608   TROPONIN T ng/mL 8.170* 2.100*         Tele: Sinus rhythm    Assessment:      Acute STEMI of inferior wall s/p LOGAN to RCA by Dr. Reddy 22    PEA arrest (HCC)    Acute systolic heart failure (HCC)    Intraprocedural VF (ventricular fibrillation) (HCC)    Intraprocedural Complete heart block (HCC)    Tobacco use    COPD (chronic obstructive pulmonary disease) (HCC)    GERD (gastroesophageal reflux disease)    Chronic hypoxemic respiratory failure (HCC)    Prediabetes    RICKIE (acute kidney injury) on probable CKD          Plan:  · Status post inferior STEMI and RCA stent.  On aspirin, and prasugrel.  Currently chest pain-free  · Tobacco abuse, resolved 1 month ago continue to encourage cessation with family and patient today  · Discussed events with the patient and family today who understand what happened to him.  · Primary VF arrest in the setting of a STEMI.  Amiodarone has .  We will not start antiarrhythmic at this time.  · Recheck LVEF by echocardiography in 24 to 48 hours prior to hospital discharge to assess need  for LifeVest  · Patient still hypotensive on dopamine.  We will wean as tolerated  · Currently beta-blocker on hold due to hypotension/dopamine.  Will resume when he is off this.  · No ACE/ARB/Arni/aldosterone antagonist due to hypotension.    Willem Nelson MD    Dictated utilizing Dragon dictation

## 2022-12-31 NOTE — PLAN OF CARE
Goal Outcome Evaluation:  Plan of Care Reviewed With: patient        Progress: improving       Pt arrived to 2H from cath lab @ 1550 on dopamine, cangrelor, & amio gtt. Cangrelor stopped and loading dose Effient given per Dr. Reddy order. SB-NSR with frequent PVCs. Dopamine weaned slightly as pt tolerated.  SBP maintainted 90-100s. 4L NC. BS not audible at this time. Afebrile. Right fem venous sheath CDI, soft. No complaints of pain. Trena (daughter) at bedside, updated on pt condition.

## 2022-12-31 NOTE — PROGRESS NOTES
"INTENSIVIST NOTE     Hospital Day: 1    Mr. Tucker Ambrosio, 77 y.o. male is followed for:   Acute ST elevation MI and COPD       SUBJECTIVE     Interval history:    Awake alert and oriented.  Afebrile.  Fluid balance roughly even.  Remains on a low-dose of dopamine.  On no other drips currently.    The patient's relevant past medical, surgical and social history were reviewed and updated in Epic as appropriate.       OBJECTIVE     Vital Sign Min/Max for last 24 hours  Temp  Min: 97.4 °F (36.3 °C)  Max: 98.4 °F (36.9 °C)   BP  Min: 78/51  Max: 112/66   Pulse  Min: 51  Max: 95   Resp  Min: 14  Max: 20   SpO2  Min: 90 %  Max: 100 %   No data recorded   Weight  Min: 59 kg (130 lb 1.1 oz)  Max: 59 kg (130 lb 1.1 oz)      Intake/Output Summary (Last 24 hours) at 12/31/2022 1432  Last data filed at 12/31/2022 0800  Gross per 24 hour   Intake 637 ml   Output 750 ml   Net -113 ml      Flowsheet Rows    Flowsheet Row First Filed Value   Admission Height 165.1 cm (65\") Documented at 12/30/2022 1405   Admission Weight 59 kg (130 lb) Documented at 12/30/2022 1405             12/30/22  1405 12/30/22  1847 12/30/22  1848   Weight: 59 kg (130 lb) 59 kg (130 lb 1.1 oz) 59 kg (130 lb 1.1 oz)            Objective:  General Appearance:  In no acute distress.    Vital signs: (most recent): Blood pressure (!) 78/51, pulse 56, temperature 97.4 °F (36.3 °C), temperature source Oral, resp. rate 16, height 165.1 cm (65\"), weight 59 kg (130 lb 1.1 oz), SpO2 97 %.    HEENT: Normal HEENT exam.    Lungs:  Normal effort and normal respiratory rate.  Breath sounds clear to auscultation.  He is not in respiratory distress.  No rales, wheezes or rhonchi.    Heart: Normal rate.  Regular rhythm.  S1 normal and S2 normal.  No murmur, gallop or friction rub.   Chest: Symmetric chest wall expansion.   Abdomen: Abdomen is soft and non-distended.  Bowel sounds are normal.   There is no abdominal tenderness.   There is no mass. There is no splenomegaly. There " is no hepatomegaly.   Extremities: There is no deformity or dependent edema.    Neurological: Patient is alert and oriented to person, place and time.    Pupils:  Pupils are equal, round, and reactive to light.    Skin:  Warm and dry.              I reviewed the patient's new clinical results.  I reviewed the patient's new imaging results/reports including actual images and agree with reports.    XR Chest 1 View    Result Date: 12/30/2022  COPD with atelectasis or pneumonia in the retrocardiac left lower lobe  This report was finalized on 12/30/2022 11:15 PM by Viral Fermin.         INFUSIONS  amiodarone, 0.5 mg/min, Last Rate: Stopped (12/31/22 0629)  DOPamine, 2-20 mcg/kg/min, Last Rate: 5.5 mcg/kg/min (12/31/22 1332)        Results from last 7 days   Lab Units 12/31/22  0300 12/30/22  1608   WBC 10*3/mm3 11.49* 11.94*   HEMOGLOBIN g/dL 13.9 13.9   HEMATOCRIT % 43.0 43.7   PLATELETS 10*3/mm3 508* 577*     Results from last 7 days   Lab Units 12/31/22  0302 12/30/22  1608 12/30/22  1415   SODIUM mmol/L 137 137  --    POTASSIUM mmol/L 4.4 4.2  --    CHLORIDE mmol/L 102 103  --    CO2 mmol/L 23.0 23.0  --    BUN mg/dL 15 15  --    GLUCOSE mg/dL 160* 223*  --    CREATININE mg/dL 1.12 1.40* 1.60*   MAGNESIUM mg/dL 3.3* 1.7  --    CALCIUM mg/dL 8.3* 8.5*  --                Patient isn't on Tube Feeding   /h  Patient doesn't have any tube feeding modular orders    Mechanical Ventilator:   Settings: Observed:                                                I reviewed the patient's medications.    Assessment & Plan   ASSESSMENT/PLAN     Active Hospital Problems    Diagnosis    • **Acute STEMI of inferior wall s/p LOGAN to RCA by Dr. Reddy 12/30/22    • PEA arrest (HCC)    • Acute systolic heart failure (HCC)    • Intraprocedural VF (ventricular fibrillation) (HCC)    • Intraprocedural Complete heart block (HCC)    • Tobacco use    • COPD (chronic obstructive pulmonary disease) (HCC)    • GERD (gastroesophageal reflux  disease)    • Chronic hypoxemic respiratory failure (HCC)    • Prediabetes    • RICKIE (acute kidney injury) on probable CKD        77-year-old male with a past medical history significant for COPD and longstanding smoking.  He has been on home oxygen at 2 L.  He had influenza a month ago and this set him back significantly from a respiratory standpoint.  He quit smoking around that time.    On 12/30 he developed shortness of breath, chest pain, and sweating.  EMS was called and they had difficulty finding a pulse and he was given CPR for period of time.  ROSC was obtained and 3 minutes per charting.  EKG was consistent with an inferior STEMI.  He was brought to [facility] and underwent left heart catheterization and had a LOGAN to RCA.  EF was 40%.  In the Cath Lab he had V. fib with successful defibrillation.  He required temporary pacing for heart block during the catheterization.  He was placed in ICU post procedure.    Today he remains on a low-dose of dopamine.  He is off all other drips.  Amiodarone was held due to bradycardia and lack of P waves.  He is awake alert and oriented.  On aspirin and prasugrel.  I do not appreciate a consolidative infiltrate on chest x-ray.  Significant chronic changes present.    Plan:    1. Aspirin and Effient  2. Antiarrhythmics held.  Defer to cardiology in this regard.  3. Statin  4. As needed bronchodilators  5. Nicotine patch  6. Sliding-scale insulin  7. Will assist with critical care, pulmonary, and medical management     I discussed the patient's findings and my recommendations with patient and nursing staff     Plan of care and goals reviewed with multidisciplinary team at daily rounds.    .    Xu Montano MD  Pulmonary and Critical Care Medicine  12/31/22 14:32 EST

## 2023-01-01 LAB
ALBUMIN SERPL-MCNC: 2.6 G/DL (ref 3.5–5.2)
ANION GAP SERPL CALCULATED.3IONS-SCNC: 8 MMOL/L (ref 5–15)
BASOPHILS # BLD AUTO: 0.03 10*3/MM3 (ref 0–0.2)
BASOPHILS NFR BLD AUTO: 0.3 % (ref 0–1.5)
BUN SERPL-MCNC: 21 MG/DL (ref 8–23)
BUN/CREAT SERPL: 19.3 (ref 7–25)
CALCIUM SPEC-SCNC: 8 MG/DL (ref 8.6–10.5)
CHLORIDE SERPL-SCNC: 107 MMOL/L (ref 98–107)
CO2 SERPL-SCNC: 25 MMOL/L (ref 22–29)
CREAT SERPL-MCNC: 1.09 MG/DL (ref 0.76–1.27)
DEPRECATED RDW RBC AUTO: 51.6 FL (ref 37–54)
EGFRCR SERPLBLD CKD-EPI 2021: 70.3 ML/MIN/1.73
EOSINOPHIL # BLD AUTO: 0.11 10*3/MM3 (ref 0–0.4)
EOSINOPHIL NFR BLD AUTO: 1.1 % (ref 0.3–6.2)
ERYTHROCYTE [DISTWIDTH] IN BLOOD BY AUTOMATED COUNT: 14.4 % (ref 12.3–15.4)
GLUCOSE BLDC GLUCOMTR-MCNC: 114 MG/DL (ref 70–130)
GLUCOSE BLDC GLUCOMTR-MCNC: 123 MG/DL (ref 70–130)
GLUCOSE BLDC GLUCOMTR-MCNC: 127 MG/DL (ref 70–130)
GLUCOSE BLDC GLUCOMTR-MCNC: 136 MG/DL (ref 70–130)
GLUCOSE SERPL-MCNC: 128 MG/DL (ref 65–99)
HCT VFR BLD AUTO: 39.8 % (ref 37.5–51)
HGB BLD-MCNC: 13.1 G/DL (ref 13–17.7)
IMM GRANULOCYTES # BLD AUTO: 0.1 10*3/MM3 (ref 0–0.05)
IMM GRANULOCYTES NFR BLD AUTO: 1 % (ref 0–0.5)
LYMPHOCYTES # BLD AUTO: 2.91 10*3/MM3 (ref 0.7–3.1)
LYMPHOCYTES NFR BLD AUTO: 29.1 % (ref 19.6–45.3)
MAGNESIUM SERPL-MCNC: 2 MG/DL (ref 1.6–2.4)
MCH RBC QN AUTO: 32 PG (ref 26.6–33)
MCHC RBC AUTO-ENTMCNC: 32.9 G/DL (ref 31.5–35.7)
MCV RBC AUTO: 97.1 FL (ref 79–97)
MONOCYTES # BLD AUTO: 1.06 10*3/MM3 (ref 0.1–0.9)
MONOCYTES NFR BLD AUTO: 10.6 % (ref 5–12)
NEUTROPHILS NFR BLD AUTO: 5.78 10*3/MM3 (ref 1.7–7)
NEUTROPHILS NFR BLD AUTO: 57.9 % (ref 42.7–76)
NRBC BLD AUTO-RTO: 0 /100 WBC (ref 0–0.2)
PHOSPHATE SERPL-MCNC: 2.4 MG/DL (ref 2.5–4.5)
PLATELET # BLD AUTO: 460 10*3/MM3 (ref 140–450)
PMV BLD AUTO: 9.4 FL (ref 6–12)
POTASSIUM SERPL-SCNC: 4.6 MMOL/L (ref 3.5–5.2)
RBC # BLD AUTO: 4.1 10*6/MM3 (ref 4.14–5.8)
SODIUM SERPL-SCNC: 140 MMOL/L (ref 136–145)
WBC NRBC COR # BLD: 9.99 10*3/MM3 (ref 3.4–10.8)

## 2023-01-01 PROCEDURE — 94799 UNLISTED PULMONARY SVC/PX: CPT

## 2023-01-01 PROCEDURE — 93010 ELECTROCARDIOGRAM REPORT: CPT | Performed by: INTERNAL MEDICINE

## 2023-01-01 PROCEDURE — 25010000002 DOPAMINE PER 40 MG: Performed by: INTERNAL MEDICINE

## 2023-01-01 PROCEDURE — 93005 ELECTROCARDIOGRAM TRACING: CPT | Performed by: INTERNAL MEDICINE

## 2023-01-01 PROCEDURE — 85025 COMPLETE CBC W/AUTO DIFF WBC: CPT | Performed by: INTERNAL MEDICINE

## 2023-01-01 PROCEDURE — 99232 SBSQ HOSP IP/OBS MODERATE 35: CPT | Performed by: INTERNAL MEDICINE

## 2023-01-01 PROCEDURE — 82962 GLUCOSE BLOOD TEST: CPT

## 2023-01-01 PROCEDURE — 99233 SBSQ HOSP IP/OBS HIGH 50: CPT | Performed by: INTERNAL MEDICINE

## 2023-01-01 PROCEDURE — 83735 ASSAY OF MAGNESIUM: CPT | Performed by: INTERNAL MEDICINE

## 2023-01-01 PROCEDURE — P9041 ALBUMIN (HUMAN),5%, 50ML: HCPCS | Performed by: INTERNAL MEDICINE

## 2023-01-01 PROCEDURE — 25010000002 ALBUMIN HUMAN 5% PER 50 ML: Performed by: INTERNAL MEDICINE

## 2023-01-01 PROCEDURE — 80069 RENAL FUNCTION PANEL: CPT | Performed by: INTERNAL MEDICINE

## 2023-01-01 RX ORDER — IPRATROPIUM BROMIDE AND ALBUTEROL SULFATE 2.5; .5 MG/3ML; MG/3ML
3 SOLUTION RESPIRATORY (INHALATION)
Status: DISCONTINUED | OUTPATIENT
Start: 2023-01-01 | End: 2023-01-06 | Stop reason: HOSPADM

## 2023-01-01 RX ORDER — ALBUMIN, HUMAN INJ 5% 5 %
250 SOLUTION INTRAVENOUS ONCE
Status: COMPLETED | OUTPATIENT
Start: 2023-01-01 | End: 2023-01-01

## 2023-01-01 RX ADMIN — POTASSIUM & SODIUM PHOSPHATES POWDER PACK 280-160-250 MG 2 PACKET: 280-160-250 PACK at 16:15

## 2023-01-01 RX ADMIN — PANTOPRAZOLE SODIUM 40 MG: 40 TABLET, DELAYED RELEASE ORAL at 06:57

## 2023-01-01 RX ADMIN — IPRATROPIUM BROMIDE AND ALBUTEROL SULFATE 3 ML: 2.5; .5 SOLUTION RESPIRATORY (INHALATION) at 13:26

## 2023-01-01 RX ADMIN — IPRATROPIUM BROMIDE AND ALBUTEROL SULFATE 3 ML: 2.5; .5 SOLUTION RESPIRATORY (INHALATION) at 17:05

## 2023-01-01 RX ADMIN — Medication 1 PATCH: at 08:16

## 2023-01-01 RX ADMIN — IPRATROPIUM BROMIDE AND ALBUTEROL SULFATE 3 ML: 2.5; .5 SOLUTION RESPIRATORY (INHALATION) at 20:03

## 2023-01-01 RX ADMIN — ROSUVASTATIN 20 MG: 20 TABLET, FILM COATED ORAL at 20:26

## 2023-01-01 RX ADMIN — DOPAMINE HYDROCHLORIDE 8 MCG/KG/MIN: 160 INJECTION, SOLUTION INTRAVENOUS at 13:59

## 2023-01-01 RX ADMIN — ASPIRIN 81 MG: 81 TABLET, COATED ORAL at 08:14

## 2023-01-01 RX ADMIN — ALBUMIN (HUMAN) 250 ML: 12.5 INJECTION, SOLUTION INTRAVENOUS at 16:11

## 2023-01-01 RX ADMIN — PRASUGREL 5 MG: 10 TABLET, FILM COATED ORAL at 08:14

## 2023-01-01 NOTE — PROGRESS NOTES
"INTENSIVIST NOTE     Hospital Day: 2    Mr. Tucker Ambrosio, 77 y.o. male is followed for:   Acute ST elevation MI and COPD       SUBJECTIVE     Interval history:    Awake alert and oriented.  Remains on a low-dose dopamine primarily due to hypotension.  Afebrile.  On 3 L nasal cannula oxygen.  Normal sinus rhythm.  Mildly bradycardic.    The patient's relevant past medical, surgical and social history were reviewed and updated in Epic as appropriate.       OBJECTIVE     Vital Sign Min/Max for last 24 hours  Temp  Min: 97.4 °F (36.3 °C)  Max: 98.1 °F (36.7 °C)   BP  Min: 81/47  Max: 104/58   Pulse  Min: 53  Max: 70   Resp  Min: 14  Max: 20   SpO2  Min: 91 %  Max: 98 %   No data recorded   No data recorded      Intake/Output Summary (Last 24 hours) at 1/1/2023 1501  Last data filed at 1/1/2023 1013  Gross per 24 hour   Intake 1270 ml   Output 675 ml   Net 595 ml      Flowsheet Rows    Flowsheet Row First Filed Value   Admission Height 165.1 cm (65\") Documented at 12/30/2022 1405   Admission Weight 59 kg (130 lb) Documented at 12/30/2022 1405             12/30/22  1405 12/30/22  1847 12/30/22  1848   Weight: 59 kg (130 lb) 59 kg (130 lb 1.1 oz) 59 kg (130 lb 1.1 oz)            Objective:  General Appearance:  In no acute distress.    Vital signs: (most recent): Blood pressure (!) 85/54, pulse 65, temperature 97.4 °F (36.3 °C), temperature source Axillary, resp. rate 18, height 165.1 cm (65\"), weight 59 kg (130 lb 1.1 oz), SpO2 93 %.    HEENT: Normal HEENT exam.    Lungs:  Normal effort and normal respiratory rate.  Breath sounds clear to auscultation.  He is not in respiratory distress.  No rales, wheezes or rhonchi.    Heart: Normal rate.  Regular rhythm.  S1 normal and S2 normal.  No murmur, gallop or friction rub.   Chest: Symmetric chest wall expansion.   Abdomen: Abdomen is soft and non-distended.  Bowel sounds are normal.   There is no abdominal tenderness.   There is no mass. There is no splenomegaly. There is no " hepatomegaly.   Extremities: There is no deformity or dependent edema.    Neurological: Patient is alert and oriented to person, place and time.    Pupils:  Pupils are equal, round, and reactive to light.    Skin:  Warm and dry.              I reviewed the patient's new clinical results.  I reviewed the patient's new imaging results/reports including actual images and agree with reports.    XR Chest 1 View    Result Date: 12/30/2022  COPD with atelectasis or pneumonia in the retrocardiac left lower lobe  This report was finalized on 12/30/2022 11:15 PM by Viral Fermin.         INFUSIONS  DOPamine, 2-20 mcg/kg/min, Last Rate: 9 mcg/kg/min (01/01/23 1415)  phenylephrine, 0.5-3 mcg/kg/min        Results from last 7 days   Lab Units 01/01/23  0342 12/31/22  0300 12/30/22  1608   WBC 10*3/mm3 9.99 11.49* 11.94*   HEMOGLOBIN g/dL 13.1 13.9 13.9   HEMATOCRIT % 39.8 43.0 43.7   PLATELETS 10*3/mm3 460* 508* 577*     Results from last 7 days   Lab Units 01/01/23  0342 12/31/22  0302 12/30/22  1608   SODIUM mmol/L 140 137 137   POTASSIUM mmol/L 4.6 4.4 4.2   CHLORIDE mmol/L 107 102 103   CO2 mmol/L 25.0 23.0 23.0   BUN mg/dL 21 15 15   GLUCOSE mg/dL 128* 160* 223*   CREATININE mg/dL 1.09 1.12 1.40*   MAGNESIUM mg/dL 2.0 3.3* 1.7   CALCIUM mg/dL 8.0* 8.3* 8.5*               Patient isn't on Tube Feeding   /h  Patient doesn't have any tube feeding modular orders    Mechanical Ventilator:   Settings: Observed:                                                I reviewed the patient's medications.    Assessment & Plan   ASSESSMENT/PLAN     Active Hospital Problems    Diagnosis    • **Acute STEMI of inferior wall s/p LOGAN to RCA by Dr. Reddy 12/30/22    • PEA arrest (HCC)    • Acute systolic heart failure (HCC)    • Intraprocedural VF (ventricular fibrillation) (HCC)    • Intraprocedural Complete heart block (HCC)    • Tobacco use    • COPD (chronic obstructive pulmonary disease) (HCC)    • GERD (gastroesophageal reflux disease)    •  Chronic hypoxemic respiratory failure (HCC)    • Prediabetes    • RICKIE (acute kidney injury) on probable CKD        77-year-old male with a past medical history significant for COPD and longstanding smoking.  He has been on home oxygen at 2 L.  He had influenza a month ago and this set him back significantly from a respiratory standpoint.  He quit smoking around that time.    On 12/30 he developed shortness of breath, chest pain, and sweating.  EMS was called and they had difficulty finding a pulse and he was given CPR for period of time.  ROSC was obtained and 3 minutes per charting.  EKG was consistent with an inferior STEMI.  He was brought to [facility] and underwent left heart catheterization and had a LOGAN to RCA.  EF was 40%.  In the Cath Lab he had V. fib with successful defibrillation.  He required temporary pacing for heart block during the catheterization.  He was placed in ICU post procedure.    Currently awake alert and oriented.  Remains on a low-dose dopamine primarily due to hypotension.  Afebrile.  On 3 L nasal cannula oxygen.  Normal sinus rhythm.  Mildly bradycardic.    Plan:    1. Given hypotension, low EF, and decreased albumin will give some supplemental colloid in an attempt to wean his pressors.  See no evidence of volume overload clinically.  2. Aspirin and Effient  3. Statin  4. As needed bronchodilators  5. Nicotine patch  6. Sliding-scale insulin  7. Will assist with critical care, pulmonary, and medical management     I discussed the patient's findings and my recommendations with patient and nursing staff     Plan of care and goals reviewed with multidisciplinary team at daily rounds.    . High level of risk due to:  *illness with threat to life or bodily function.    Xu Montano MD  Pulmonary and Critical Care Medicine  01/01/23 15:01 EST

## 2023-01-01 NOTE — PROGRESS NOTES
"[facility] Cardiology Daily Note       LOS: 2 days   Patient Care Team:  Avery Sheldon MD as PCP - General (Family Medicine)    Chief Complaint: NSTEMI/VF    Subjective     Subjective: Sitting up eating breakfast 94% on 2 L nasal cannula with the cannula is not really even in his nose..  Heart rates in the 50s and 60s.  Blood pressures have been in the 80s and 90s.  Systolic pressures currently 104 mmHg.    Dopamine: 6 mcg/kg/min    Review of Systems:   As above.    Medications:  aspirin, 81 mg, Oral, Daily  insulin lispro, 0-9 Units, Subcutaneous, 4x Daily With Meals & Nightly  nicotine, 1 patch, Transdermal, Q24H  pantoprazole, 40 mg, Oral, Q AM  pharmacy consult - [place], , Does not apply, Daily  prasugrel, 5 mg, Oral, Daily  rosuvastatin, 20 mg, Oral, Nightly        Objective     Vital Sign Min/Max for last 24 hours  Temp  Min: 97.4 °F (36.3 °C)  Max: 98.1 °F (36.7 °C)   BP  Min: 71/51  Max: 102/61   Pulse  Min: 53  Max: 72   Resp  Min: 15  Max: 18   SpO2  Min: 91 %  Max: 97 %   Flow (L/min)  Min: 1  Max: 2   No data recorded      Intake/Output Summary (Last 24 hours) at 1/1/2023 0817  Last data filed at 12/31/2022 2200  Gross per 24 hour   Intake 786 ml   Output 875 ml   Net -89 ml        Flowsheet Rows    Flowsheet Row First Filed Value   Admission Height 165.1 cm (65\") Documented at 12/30/2022 1405   Admission Weight 59 kg (130 lb) Documented at 12/30/2022 1405          Physical Exam:    General: Alert and oriented.   Cardiovascular: Heart has a nondisplaced focal PMI. Regular rate and rhythm without murmur, gallop or rub.  Lungs: Rhonchi noted bilaterally left greater than right equal expansion is noted.   Abdomen: Soft, nontender.  Extremities: Show no edema.   Skin: warm and dry.     Results Review:    I reviewed the patient's new clinical results.  EKG:  Tele: Sinus rhythm    Labs:    Results from last 7 days   Lab Units 01/01/23  0342 12/31/22  0302 12/30/22  1608   SODIUM mmol/L " 140 137 137   POTASSIUM mmol/L 4.6 4.4 4.2   CHLORIDE mmol/L 107 102 103   CO2 mmol/L 25.0 23.0 23.0   BUN mg/dL 21 15 15   CREATININE mg/dL 1.09 1.12 1.40*   CALCIUM mg/dL 8.0* 8.3* 8.5*   BILIRUBIN mg/dL  --   --  0.5   ALK PHOS U/L  --   --  200*   ALT (SGPT) U/L  --   --  51*   AST (SGOT) U/L  --   --  167*   GLUCOSE mg/dL 128* 160* 223*     Results from last 7 days   Lab Units 01/01/23  0342 12/31/22  0300 12/30/22  1608   WBC 10*3/mm3 9.99 11.49* 11.94*   HEMOGLOBIN g/dL 13.1 13.9 13.9   HEMATOCRIT % 39.8 43.0 43.7   PLATELETS 10*3/mm3 460* 508* 577*     Lab Results   Component Value Date    TROPONINT 8.170 (C) 12/31/2022    TROPONINT 2.100 (C) 12/30/2022     Lab Results   Component Value Date    CHOL 134 12/31/2022     Lab Results   Component Value Date    TRIG 68 12/31/2022     Lab Results   Component Value Date    HDL 36 (L) 12/31/2022     No components found for: LDLCALC  No results found for: INR, PROTIME      Ejection Fraction: 30-35% with mild MR and TR    Assessment   Assessment:  1. Inferior STEMI, CAD  a. Status post LOGAN x1 to 100% RCA  2. Complete heart block  a. Status post temporary pacemaker wire during heart cath.  Removed prior to transfer to ICU  3. Intraprocedural ventricular fibrillation  a. Status post defibrillation x1 intra procedurally, conversion to sinus rhythm  b. On amiodarone briefly.  No discontinued.  4. Acute systolic heart failure  a. EF 45% with inferior hypokinesis  5. Recent influenza  6. Tobacco abuse resolved 1 month ago      Plan:  Continue aspirin and Effient therapy status post stent to the RCA. (Maintenance dose 5 mg daily as age greater than 75)   High intensity statin  Beta-blocker when blood pressure allows off of vasopressor  Eventual ACEI/ARB/ARN I when blood pressure allows   Wean vasopressors to maintain a MAP greater than 65 mmHg  Add DuoNebs and flutter valve    Estelita Keith MD  01/01/23  08:17 EST

## 2023-01-02 LAB
ANION GAP SERPL CALCULATED.3IONS-SCNC: 8 MMOL/L (ref 5–15)
BUN SERPL-MCNC: 25 MG/DL (ref 8–23)
BUN/CREAT SERPL: 19.1 (ref 7–25)
CALCIUM SPEC-SCNC: 8.1 MG/DL (ref 8.6–10.5)
CHLORIDE SERPL-SCNC: 106 MMOL/L (ref 98–107)
CO2 SERPL-SCNC: 24 MMOL/L (ref 22–29)
CREAT SERPL-MCNC: 1.31 MG/DL (ref 0.76–1.27)
DEPRECATED RDW RBC AUTO: 51 FL (ref 37–54)
EGFRCR SERPLBLD CKD-EPI 2021: 56.4 ML/MIN/1.73
ERYTHROCYTE [DISTWIDTH] IN BLOOD BY AUTOMATED COUNT: 14 % (ref 12.3–15.4)
GLUCOSE BLDC GLUCOMTR-MCNC: 114 MG/DL (ref 70–130)
GLUCOSE BLDC GLUCOMTR-MCNC: 121 MG/DL (ref 70–130)
GLUCOSE BLDC GLUCOMTR-MCNC: 123 MG/DL (ref 70–130)
GLUCOSE BLDC GLUCOMTR-MCNC: 124 MG/DL (ref 70–130)
GLUCOSE SERPL-MCNC: 137 MG/DL (ref 65–99)
HCT VFR BLD AUTO: 37.2 % (ref 37.5–51)
HGB BLD-MCNC: 12.1 G/DL (ref 13–17.7)
MAGNESIUM SERPL-MCNC: 2.8 MG/DL (ref 1.6–2.4)
MCH RBC QN AUTO: 31.8 PG (ref 26.6–33)
MCHC RBC AUTO-ENTMCNC: 32.5 G/DL (ref 31.5–35.7)
MCV RBC AUTO: 97.9 FL (ref 79–97)
PHOSPHATE SERPL-MCNC: 3 MG/DL (ref 2.5–4.5)
PLATELET # BLD AUTO: 425 10*3/MM3 (ref 140–450)
PMV BLD AUTO: 9.7 FL (ref 6–12)
POTASSIUM SERPL-SCNC: 4.6 MMOL/L (ref 3.5–5.2)
RBC # BLD AUTO: 3.8 10*6/MM3 (ref 4.14–5.8)
SODIUM SERPL-SCNC: 138 MMOL/L (ref 136–145)
WBC NRBC COR # BLD: 9.85 10*3/MM3 (ref 3.4–10.8)

## 2023-01-02 PROCEDURE — 25010000002 DOPAMINE PER 40 MG: Performed by: INTERNAL MEDICINE

## 2023-01-02 PROCEDURE — 25010000002 ALBUMIN HUMAN 25% PER 50 ML: Performed by: INTERNAL MEDICINE

## 2023-01-02 PROCEDURE — 99232 SBSQ HOSP IP/OBS MODERATE 35: CPT | Performed by: INTERNAL MEDICINE

## 2023-01-02 PROCEDURE — P9041 ALBUMIN (HUMAN),5%, 50ML: HCPCS | Performed by: INTERNAL MEDICINE

## 2023-01-02 PROCEDURE — 94799 UNLISTED PULMONARY SVC/PX: CPT

## 2023-01-02 PROCEDURE — P9047 ALBUMIN (HUMAN), 25%, 50ML: HCPCS | Performed by: INTERNAL MEDICINE

## 2023-01-02 PROCEDURE — 80048 BASIC METABOLIC PNL TOTAL CA: CPT | Performed by: NURSE PRACTITIONER

## 2023-01-02 PROCEDURE — 84100 ASSAY OF PHOSPHORUS: CPT | Performed by: NURSE PRACTITIONER

## 2023-01-02 PROCEDURE — 83735 ASSAY OF MAGNESIUM: CPT | Performed by: NURSE PRACTITIONER

## 2023-01-02 PROCEDURE — 93010 ELECTROCARDIOGRAM REPORT: CPT | Performed by: INTERNAL MEDICINE

## 2023-01-02 PROCEDURE — 82962 GLUCOSE BLOOD TEST: CPT

## 2023-01-02 PROCEDURE — 25010000002 ALBUMIN HUMAN 5% PER 50 ML: Performed by: INTERNAL MEDICINE

## 2023-01-02 PROCEDURE — 85027 COMPLETE CBC AUTOMATED: CPT | Performed by: NURSE PRACTITIONER

## 2023-01-02 PROCEDURE — 93005 ELECTROCARDIOGRAM TRACING: CPT | Performed by: INTERNAL MEDICINE

## 2023-01-02 RX ORDER — ALBUMIN, HUMAN INJ 5% 5 %
500 SOLUTION INTRAVENOUS ONCE
Status: COMPLETED | OUTPATIENT
Start: 2023-01-02 | End: 2023-01-02

## 2023-01-02 RX ORDER — ALBUMIN (HUMAN) 12.5 G/50ML
25 SOLUTION INTRAVENOUS ONCE
Status: COMPLETED | OUTPATIENT
Start: 2023-01-02 | End: 2023-01-02

## 2023-01-02 RX ADMIN — IPRATROPIUM BROMIDE AND ALBUTEROL SULFATE 3 ML: 2.5; .5 SOLUTION RESPIRATORY (INHALATION) at 08:05

## 2023-01-02 RX ADMIN — ASPIRIN 81 MG: 81 TABLET, COATED ORAL at 08:12

## 2023-01-02 RX ADMIN — ROSUVASTATIN 20 MG: 20 TABLET, FILM COATED ORAL at 20:12

## 2023-01-02 RX ADMIN — PANTOPRAZOLE SODIUM 40 MG: 40 TABLET, DELAYED RELEASE ORAL at 06:21

## 2023-01-02 RX ADMIN — IPRATROPIUM BROMIDE AND ALBUTEROL SULFATE 3 ML: 2.5; .5 SOLUTION RESPIRATORY (INHALATION) at 15:57

## 2023-01-02 RX ADMIN — DOPAMINE HYDROCHLORIDE 9 MCG/KG/MIN: 160 INJECTION, SOLUTION INTRAVENOUS at 01:29

## 2023-01-02 RX ADMIN — PRASUGREL 5 MG: 10 TABLET, FILM COATED ORAL at 08:12

## 2023-01-02 RX ADMIN — Medication 1 PATCH: at 08:11

## 2023-01-02 RX ADMIN — DOPAMINE HYDROCHLORIDE 8 MCG/KG/MIN: 160 INJECTION, SOLUTION INTRAVENOUS at 15:27

## 2023-01-02 RX ADMIN — IPRATROPIUM BROMIDE AND ALBUTEROL SULFATE 3 ML: 2.5; .5 SOLUTION RESPIRATORY (INHALATION) at 21:00

## 2023-01-02 RX ADMIN — ALBUMIN (HUMAN) 500 ML: 12.5 INJECTION, SOLUTION INTRAVENOUS at 10:18

## 2023-01-02 RX ADMIN — ALBUMIN (HUMAN) 25 G: 0.25 INJECTION, SOLUTION INTRAVENOUS at 18:02

## 2023-01-02 NOTE — PROGRESS NOTES
"[facility] Cardiology Daily Note       LOS: 3 days   Patient Care Team:  Avery Sheldon MD as PCP - General (Family Medicine)    Chief Complaint: NSTEMI/VF    Subjective     Subjective: No complaints.  94% on 3 L nasal cannula.  Heart rates in the 70s.  Blood pressures have been in the 80s and 90s.  Systolic pressures currently 92 mmHg.    Dopamine: 10 mcg/kg/min    Review of Systems:   As above.    Medications:  aspirin, 81 mg, Oral, Daily  insulin lispro, 0-9 Units, Subcutaneous, 4x Daily With Meals & Nightly  ipratropium-albuterol, 3 mL, Nebulization, 4x Daily - RT  nicotine, 1 patch, Transdermal, Q24H  pantoprazole, 40 mg, Oral, Q AM  pharmacy consult - [place], , Does not apply, Daily  prasugrel, 5 mg, Oral, Daily  rosuvastatin, 20 mg, Oral, Nightly        Objective     Vital Sign Min/Max for last 24 hours  Temp  Min: 97.4 °F (36.3 °C)  Max: 98.7 °F (37.1 °C)   BP  Min: 79/53  Max: 120/64   Pulse  Min: 53  Max: 84   Resp  Min: 14  Max: 20   SpO2  Min: 92 %  Max: 98 %   Flow (L/min)  Min: 2  Max: 4   No data recorded      Intake/Output Summary (Last 24 hours) at 1/2/2023 0858  Last data filed at 1/2/2023 0615  Gross per 24 hour   Intake 1791 ml   Output 1600 ml   Net 191 ml        Flowsheet Rows    Flowsheet Row First Filed Value   Admission Height 165.1 cm (65\") Documented at 12/30/2022 1405   Admission Weight 59 kg (130 lb) Documented at 12/30/2022 1405          Physical Exam:    General: Alert and oriented.   Cardiovascular: Heart has a nondisplaced focal PMI. Regular rate and rhythm without murmur, gallop or rub.  Lungs: No rhonchi or rales heard.  Slightly decreased right base.  Abdomen: Soft, nontender.  Extremities: Show no edema.   Skin: warm and dry.     Results Review:    I reviewed the patient's new clinical results.  EKG:  Tele: Sinus rhythm reported intermittent junctional beats and possible wandering atrial pacemaker.    Labs:    Results from last 7 days   Lab Units " 01/02/23  0347 01/01/23  0342 12/31/22  0302 12/30/22  1608   SODIUM mmol/L 138 140 137 137   POTASSIUM mmol/L 4.6 4.6 4.4 4.2   CHLORIDE mmol/L 106 107 102 103   CO2 mmol/L 24.0 25.0 23.0 23.0   BUN mg/dL 25* 21 15 15   CREATININE mg/dL 1.31* 1.09 1.12 1.40*   CALCIUM mg/dL 8.1* 8.0* 8.3* 8.5*   BILIRUBIN mg/dL  --   --   --  0.5   ALK PHOS U/L  --   --   --  200*   ALT (SGPT) U/L  --   --   --  51*   AST (SGOT) U/L  --   --   --  167*   GLUCOSE mg/dL 137* 128* 160* 223*     Results from last 7 days   Lab Units 01/02/23 0347 01/01/23  0342 12/31/22  0300   WBC 10*3/mm3 9.85 9.99 11.49*   HEMOGLOBIN g/dL 12.1* 13.1 13.9   HEMATOCRIT % 37.2* 39.8 43.0   PLATELETS 10*3/mm3 425 460* 508*     Lab Results   Component Value Date    TROPONINT 8.170 (C) 12/31/2022    TROPONINT 2.100 (C) 12/30/2022     Lab Results   Component Value Date    CHOL 134 12/31/2022     Lab Results   Component Value Date    TRIG 68 12/31/2022     Lab Results   Component Value Date    HDL 36 (L) 12/31/2022     No components found for: LDLCALC  No results found for: INR, PROTIME      Ejection Fraction: 30-35% with mild MR and TR    Assessment   Assessment:  1. Inferior STEMI, CAD  a. Status post LOGAN x1 to 100% RCA  2. Complete heart block  a. Status post temporary pacemaker wire during heart cath.  Removed prior to transfer to ICU  3. Intraprocedural ventricular fibrillation  a. Status post defibrillation x1 intra procedurally, conversion to sinus rhythm  b. On amiodarone briefly.  No discontinued.  4. Acute systolic heart failure  a. EF 45% with inferior hypokinesis  5. Recent influenza  6. Tobacco abuse resolved 1 month ago      Plan:  Suspect RV involvement as the patient is persistently hypotensive.   500 mL albumin 5% IV as urine output has been marginal and creatinine is somewhat elevated.     Continue aspirin and Effient therapy status post stent to the RCA. (Maintenance dose 5 mg daily as age greater than 75)   High intensity  statin  Beta-blocker when blood pressure allows off of vasopressor  Eventual ACEI/ARB/ARN I when blood pressure allows   Wean vasopressors to maintain a MAP greater than 65 mmHg  Add Susan and elvira valve    Estelita Keith MD  01/02/23  08:58 EST

## 2023-01-02 NOTE — PROGRESS NOTES
"INTENSIVIST NOTE     Hospital Day: 3    Mr. Tucker Ambrosio, 77 y.o. male is followed for:   Acute ST elevation MI and COPD       SUBJECTIVE     Interval history:    Has had persistent hypotension and remains on dopamine currently at 10 mcg/kilogram/minute.  Was given additional IV fluids in the form of colloid yesterday though I's/O remains negative.  On 3 L O2.  Rhythm is sinus.  Awake alert and oriented.  Afebrile.    The patient's relevant past medical, surgical and social history were reviewed and updated in Epic as appropriate.       OBJECTIVE     Vital Sign Min/Max for last 24 hours  Temp  Min: 97.3 °F (36.3 °C)  Max: 98.7 °F (37.1 °C)   BP  Min: 79/53  Max: 148/88   Pulse  Min: 56  Max: 109   Resp  Min: 14  Max: 20   SpO2  Min: 91 %  Max: 98 %   No data recorded   No data recorded      Intake/Output Summary (Last 24 hours) at 1/2/2023 1428  Last data filed at 1/2/2023 0900  Gross per 24 hour   Intake 1051 ml   Output 1600 ml   Net -549 ml      Flowsheet Rows    Flowsheet Row First Filed Value   Admission Height 165.1 cm (65\") Documented at 12/30/2022 1405   Admission Weight 59 kg (130 lb) Documented at 12/30/2022 1405             12/30/22  1405 12/30/22  1847 12/30/22  1848   Weight: 59 kg (130 lb) 59 kg (130 lb 1.1 oz) 59 kg (130 lb 1.1 oz)            Objective:  General Appearance:  In no acute distress.    Vital signs: (most recent): Blood pressure 91/54, pulse 71, temperature 97.3 °F (36.3 °C), temperature source Axillary, resp. rate 16, height 165.1 cm (65\"), weight 59 kg (130 lb 1.1 oz), SpO2 92 %.    HEENT: Normal HEENT exam.    Lungs:  Normal effort and normal respiratory rate.  Breath sounds clear to auscultation.  He is not in respiratory distress.  No rales, wheezes or rhonchi.    Heart: Normal rate.  Regular rhythm.  S1 normal and S2 normal.  No murmur, gallop or friction rub.   Chest: Symmetric chest wall expansion.   Abdomen: Abdomen is soft and non-distended.  Bowel sounds are normal.   There is " no abdominal tenderness.   There is no mass. There is no splenomegaly. There is no hepatomegaly.   Extremities: There is no deformity or dependent edema.    Neurological: Patient is alert and oriented to person, place and time.    Pupils:  Pupils are equal, round, and reactive to light.    Skin:  Warm and dry.              I reviewed the patient's new clinical results.  I reviewed the patient's new imaging results/reports including actual images and agree with reports.    No radiology results for the last day     INFUSIONS  DOPamine, 2-20 mcg/kg/min, Last Rate: 8 mcg/kg/min (01/02/23 1300)  phenylephrine, 0.5-3 mcg/kg/min        Results from last 7 days   Lab Units 01/02/23  0347 01/01/23  0342 12/31/22  0300   WBC 10*3/mm3 9.85 9.99 11.49*   HEMOGLOBIN g/dL 12.1* 13.1 13.9   HEMATOCRIT % 37.2* 39.8 43.0   PLATELETS 10*3/mm3 425 460* 508*     Results from last 7 days   Lab Units 01/02/23  0347 01/01/23  0342 12/31/22  0302   SODIUM mmol/L 138 140 137   POTASSIUM mmol/L 4.6 4.6 4.4   CHLORIDE mmol/L 106 107 102   CO2 mmol/L 24.0 25.0 23.0   BUN mg/dL 25* 21 15   GLUCOSE mg/dL 137* 128* 160*   CREATININE mg/dL 1.31* 1.09 1.12   MAGNESIUM mg/dL 2.8* 2.0 3.3*   CALCIUM mg/dL 8.1* 8.0* 8.3*               Patient isn't on Tube Feeding   /h  Patient doesn't have any tube feeding modular orders    Mechanical Ventilator:   Settings: Observed:                                                I reviewed the patient's medications.    Assessment & Plan   ASSESSMENT/PLAN     Active Hospital Problems    Diagnosis    • **Acute STEMI of inferior wall s/p LOGAN to RCA by Dr. Reddy 12/30/22    • PEA arrest (HCC)    • Acute systolic heart failure (HCC)    • Intraprocedural VF (ventricular fibrillation) (HCC)    • Intraprocedural Complete heart block (HCC)    • Tobacco use    • COPD (chronic obstructive pulmonary disease) (HCC)    • GERD (gastroesophageal reflux disease)    • Chronic hypoxemic respiratory failure (HCC)    • Prediabetes    •  RICKIE (acute kidney injury) on probable CKD        77-year-old male with a past medical history significant for COPD and longstanding smoking.  He has been on home oxygen at 2 L.  He had influenza a month ago and this set him back significantly from a respiratory standpoint.  He quit smoking around that time.    On 12/30 he developed shortness of breath, chest pain, and sweating.  EMS was called and they had difficulty finding a pulse and he was given CPR for period of time.  ROSC was obtained and 3 minutes per charting.  EKG was consistent with an inferior STEMI.  He was brought to [facility] and underwent left heart catheterization and had a LOGAN to RCA.  EF was 40%.  In the Cath Lab he had V. fib with successful defibrillation.  He required temporary pacing for heart block during the catheterization.  He was placed in ICU post procedure.    Has had persistent hypotension since arrival to ICU and remains on dopamine currently at 10 mcg/kilogram/minute.  Was given additional IV fluids in the form of colloid yesterday though I's/O remains negative.  On 3 L O2.  Rhythm is sinus.  Awake alert and oriented.  Afebrile.    Plan:    Additional volume.  Agree with Dr. Keith assessment that likely has some RV dysfunction from his inferior MI.  Wean dopamine  Aspirin and Effient  Statin  As needed bronchodilators  Nicotine patch  Sliding-scale insulin     I discussed the patient's findings and my recommendations with patient and nursing staff     Plan of care and goals reviewed with multidisciplinary team at daily rounds.    . High level of risk due to:  *illness with threat to life or bodily function.    Xu Montano MD  Pulmonary and Critical Care Medicine  01/02/23 14:28 EST

## 2023-01-02 NOTE — PLAN OF CARE
Problem: Adult Inpatient Plan of Care  Goal: Plan of Care Review  Outcome: Ongoing, Progressing  Flowsheets (Taken 1/2/2023 1856)  Progress: improving  Plan of Care Reviewed With: patient  Outcome Evaluation: Pt neuro intact, ambulate in hallway. 500mls albumin given, unable to wean dopamine, 25 grams given, dopamine down from 10 to 6. UOP improved, encouraged PO intake. Family updated via telephone.

## 2023-01-02 NOTE — CASE MANAGEMENT/SOCIAL WORK
Discharge Planning Assessment  [facility]     Patient Name: Tucker Ambrosio  MRN: [id number]  Today's Date: 1/2/2023    Admit Date: 12/30/2022    Plan: Home with resume HH   Discharge Needs Assessment     Row Name 01/02/23 1537       Living Environment    People in Home alone    Current Living Arrangements home    Primary Care Provided by self    Provides Primary Care For no one    Family Caregiver if Needed child(hugo), adult    Quality of Family Relationships supportive    Able to Return to Prior Arrangements yes       Resource/Environmental Concerns    Resource/Environmental Concerns none    Transportation Concerns none       Transition Planning    Patient/Family Anticipates Transition to home with help/services    Patient/Family Anticipated Services at Transition home health care  Reports a home health has been coming but unsure of the name of the agency.    Transportation Anticipated family or friend will provide       Discharge Needs Assessment    Readmission Within the Last 30 Days no previous admission in last 30 days    Equipment Currently Used at Home walker, rolling    Concerns to be Addressed no discharge needs identified    Equipment Needed After Discharge none    Outpatient/Agency/Support Group Needs homecare agency    Discharge Facility/Level of Care Needs home with home health    Provided Post Acute Provider List? N/A    N/A Provider List Comment Reports being already current with a home health provider    Provided Post Acute Provider Quality & Resource List? N/A               Discharge Plan     Row Name 01/02/23 1544       Plan    Plan Home with resume HH    Patient/Family in Agreement with Plan yes    Provided Post Acute Provider List? N/A    Provided Post Acute Provider Quality & Resource List? N/A    Plan Comments MSW spoke with pt at bedside. Pt is independent in ADL's and IADl's with the occasional use of a rolling walker. Pt reports having oxygen at home but cannot remember the name of the  oxygen company that supplies it. Pt also reports there is home health already coming and cannot remember the name of it. MSW attempted to call Home health agencies that cover [facility] and they are closed in observance of the holiday. Pt reports he is able to take care of his daily needs but also gets help from his daughters if he needs anything. Pt reports one of his children can provide a ride home when is is discharged. Pt had no other discharge needs at this time.    Final Discharge Disposition Code 06 - home with home health care              Continued Care and Services - Admitted Since 12/30/2022    Coordination has not been started for this encounter.          Demographic Summary     Row Name 01/02/23 1536       General Information    Reason for Consult discharge planning               Functional Status     Row Name 01/02/23 1536       Functional Status, IADL    Medications independent    Meal Preparation independent    Housekeeping independent    Laundry independent    Shopping independent               Psychosocial    No documentation.                Abuse/Neglect    No documentation.                Legal    No documentation.                Substance Abuse    No documentation.                Patient Forms    No documentation.                   THOMAS Murillo

## 2023-01-02 NOTE — PLAN OF CARE
Problem: Adult Inpatient Plan of Care  Goal: Plan of Care Review  Outcome: Ongoing, Not Progressing  Flowsheets (Taken 1/1/2023 1923)  Progress: improving  Plan of Care Reviewed With: patient  Outcome Evaluation: Pt neuro intact, RAMOS, ambulated in hallway 2 x max distance of 220 ft. Dopamine continuing, increased rate to maintain MAP> 65. Sinus rhythm with wandering atrial pacemaker at times, and junctional beats. Phos replaced. Will cont to try and wean dopamine.

## 2023-01-03 LAB
ANION GAP SERPL CALCULATED.3IONS-SCNC: 9 MMOL/L (ref 5–15)
BASOPHILS # BLD AUTO: 0.04 10*3/MM3 (ref 0–0.2)
BASOPHILS NFR BLD AUTO: 0.4 % (ref 0–1.5)
BUN SERPL-MCNC: 27 MG/DL (ref 8–23)
BUN/CREAT SERPL: 23.7 (ref 7–25)
CALCIUM SPEC-SCNC: 8.6 MG/DL (ref 8.6–10.5)
CHLORIDE SERPL-SCNC: 106 MMOL/L (ref 98–107)
CO2 SERPL-SCNC: 24 MMOL/L (ref 22–29)
CREAT SERPL-MCNC: 1.14 MG/DL (ref 0.76–1.27)
DEPRECATED RDW RBC AUTO: 51.8 FL (ref 37–54)
EGFRCR SERPLBLD CKD-EPI 2021: 66.7 ML/MIN/1.73
EOSINOPHIL # BLD AUTO: 0.16 10*3/MM3 (ref 0–0.4)
EOSINOPHIL NFR BLD AUTO: 1.7 % (ref 0.3–6.2)
ERYTHROCYTE [DISTWIDTH] IN BLOOD BY AUTOMATED COUNT: 14.3 % (ref 12.3–15.4)
GLUCOSE BLDC GLUCOMTR-MCNC: 106 MG/DL (ref 70–130)
GLUCOSE BLDC GLUCOMTR-MCNC: 113 MG/DL (ref 70–130)
GLUCOSE BLDC GLUCOMTR-MCNC: 117 MG/DL (ref 70–130)
GLUCOSE BLDC GLUCOMTR-MCNC: 118 MG/DL (ref 70–130)
GLUCOSE SERPL-MCNC: 120 MG/DL (ref 65–99)
HCT VFR BLD AUTO: 33 % (ref 37.5–51)
HGB BLD-MCNC: 10.5 G/DL (ref 13–17.7)
IMM GRANULOCYTES # BLD AUTO: 0.1 10*3/MM3 (ref 0–0.05)
IMM GRANULOCYTES NFR BLD AUTO: 1.1 % (ref 0–0.5)
LYMPHOCYTES # BLD AUTO: 2.1 10*3/MM3 (ref 0.7–3.1)
LYMPHOCYTES NFR BLD AUTO: 22.6 % (ref 19.6–45.3)
MCH RBC QN AUTO: 31.5 PG (ref 26.6–33)
MCHC RBC AUTO-ENTMCNC: 31.8 G/DL (ref 31.5–35.7)
MCV RBC AUTO: 99.1 FL (ref 79–97)
MONOCYTES # BLD AUTO: 0.78 10*3/MM3 (ref 0.1–0.9)
MONOCYTES NFR BLD AUTO: 8.4 % (ref 5–12)
NEUTROPHILS NFR BLD AUTO: 6.1 10*3/MM3 (ref 1.7–7)
NEUTROPHILS NFR BLD AUTO: 65.8 % (ref 42.7–76)
NRBC BLD AUTO-RTO: 0 /100 WBC (ref 0–0.2)
PLATELET # BLD AUTO: 382 10*3/MM3 (ref 140–450)
PMV BLD AUTO: 9.9 FL (ref 6–12)
POTASSIUM SERPL-SCNC: 4.6 MMOL/L (ref 3.5–5.2)
QT INTERVAL: 394 MS
QT INTERVAL: 404 MS
QT INTERVAL: 426 MS
QT INTERVAL: 432 MS
QT INTERVAL: 434 MS
QT INTERVAL: 434 MS
QTC INTERVAL: 403 MS
QTC INTERVAL: 410 MS
QTC INTERVAL: 413 MS
QTC INTERVAL: 418 MS
QTC INTERVAL: 421 MS
QTC INTERVAL: 422 MS
RBC # BLD AUTO: 3.33 10*6/MM3 (ref 4.14–5.8)
SODIUM SERPL-SCNC: 139 MMOL/L (ref 136–145)
WBC NRBC COR # BLD: 9.28 10*3/MM3 (ref 3.4–10.8)

## 2023-01-03 PROCEDURE — 94664 DEMO&/EVAL PT USE INHALER: CPT

## 2023-01-03 PROCEDURE — 94761 N-INVAS EAR/PLS OXIMETRY MLT: CPT

## 2023-01-03 PROCEDURE — 82962 GLUCOSE BLOOD TEST: CPT

## 2023-01-03 PROCEDURE — 85025 COMPLETE CBC W/AUTO DIFF WBC: CPT | Performed by: INTERNAL MEDICINE

## 2023-01-03 PROCEDURE — 97162 PT EVAL MOD COMPLEX 30 MIN: CPT

## 2023-01-03 PROCEDURE — 80048 BASIC METABOLIC PNL TOTAL CA: CPT | Performed by: INTERNAL MEDICINE

## 2023-01-03 PROCEDURE — 99232 SBSQ HOSP IP/OBS MODERATE 35: CPT | Performed by: INTERNAL MEDICINE

## 2023-01-03 PROCEDURE — 25010000002 PHENYLEPHRINE 10 MG/ML SOLUTION 5 ML VIAL: Performed by: INTERNAL MEDICINE

## 2023-01-03 PROCEDURE — 94799 UNLISTED PULMONARY SVC/PX: CPT

## 2023-01-03 PROCEDURE — 93005 ELECTROCARDIOGRAM TRACING: CPT | Performed by: INTERNAL MEDICINE

## 2023-01-03 PROCEDURE — 93010 ELECTROCARDIOGRAM REPORT: CPT | Performed by: INTERNAL MEDICINE

## 2023-01-03 RX ORDER — MIDODRINE HYDROCHLORIDE 5 MG/1
5 TABLET ORAL
Status: DISCONTINUED | OUTPATIENT
Start: 2023-01-03 | End: 2023-01-03

## 2023-01-03 RX ORDER — ALBUTEROL SULFATE 90 UG/1
2 AEROSOL, METERED RESPIRATORY (INHALATION) EVERY 6 HOURS PRN
COMMUNITY

## 2023-01-03 RX ORDER — OMEPRAZOLE 20 MG/1
20 CAPSULE, DELAYED RELEASE ORAL DAILY
COMMUNITY

## 2023-01-03 RX ORDER — POLYETHYLENE GLYCOL 3350 17 G/17G
17 POWDER, FOR SOLUTION ORAL DAILY PRN
COMMUNITY

## 2023-01-03 RX ORDER — MIDODRINE HYDROCHLORIDE 10 MG/1
10 TABLET ORAL
Status: DISCONTINUED | OUTPATIENT
Start: 2023-01-03 | End: 2023-01-05

## 2023-01-03 RX ORDER — GABAPENTIN 100 MG/1
200 CAPSULE ORAL 2 TIMES DAILY
COMMUNITY

## 2023-01-03 RX ORDER — IPRATROPIUM BROMIDE AND ALBUTEROL SULFATE 2.5; .5 MG/3ML; MG/3ML
3 SOLUTION RESPIRATORY (INHALATION) EVERY 4 HOURS PRN
COMMUNITY

## 2023-01-03 RX ORDER — NICOTINE 21 MG/24HR
1 PATCH, TRANSDERMAL 24 HOURS TRANSDERMAL EVERY 24 HOURS
COMMUNITY

## 2023-01-03 RX ORDER — OXYCODONE HYDROCHLORIDE 10 MG/1
10 TABLET ORAL EVERY 6 HOURS PRN
COMMUNITY

## 2023-01-03 RX ADMIN — IPRATROPIUM BROMIDE AND ALBUTEROL SULFATE 3 ML: 2.5; .5 SOLUTION RESPIRATORY (INHALATION) at 20:09

## 2023-01-03 RX ADMIN — PRASUGREL 5 MG: 10 TABLET, FILM COATED ORAL at 08:27

## 2023-01-03 RX ADMIN — MIDODRINE HYDROCHLORIDE 5 MG: 5 TABLET ORAL at 08:27

## 2023-01-03 RX ADMIN — MIDODRINE HYDROCHLORIDE 10 MG: 10 TABLET ORAL at 17:00

## 2023-01-03 RX ADMIN — IPRATROPIUM BROMIDE AND ALBUTEROL SULFATE 3 ML: 2.5; .5 SOLUTION RESPIRATORY (INHALATION) at 17:12

## 2023-01-03 RX ADMIN — ROSUVASTATIN 20 MG: 20 TABLET, FILM COATED ORAL at 20:16

## 2023-01-03 RX ADMIN — ASPIRIN 81 MG: 81 TABLET, COATED ORAL at 08:26

## 2023-01-03 RX ADMIN — Medication 1 PATCH: at 08:27

## 2023-01-03 RX ADMIN — PANTOPRAZOLE SODIUM 40 MG: 40 TABLET, DELAYED RELEASE ORAL at 05:41

## 2023-01-03 RX ADMIN — MIDODRINE HYDROCHLORIDE 10 MG: 10 TABLET ORAL at 11:52

## 2023-01-03 RX ADMIN — IPRATROPIUM BROMIDE AND ALBUTEROL SULFATE 3 ML: 2.5; .5 SOLUTION RESPIRATORY (INHALATION) at 12:40

## 2023-01-03 RX ADMIN — IPRATROPIUM BROMIDE AND ALBUTEROL SULFATE 3 ML: 2.5; .5 SOLUTION RESPIRATORY (INHALATION) at 08:11

## 2023-01-03 RX ADMIN — PHENYLEPHRINE HYDROCHLORIDE 0.5 MCG/KG/MIN: 10 INJECTION INTRAVENOUS at 11:03

## 2023-01-03 NOTE — PROGRESS NOTES
[facility] Cardiology Daily Note       LOS: 4 days   Patient Care Team:  Avery Sheldon MD as PCP - General (Family Medicine)    Chief Complaint: NSTEMI/VF    Subjective     Subjective:   Denies chest pain, dyspnea, lightheadedness.  Ambulated without difficulty.    Remains on dopamine    Medications:  aspirin, 81 mg, Oral, Daily  insulin lispro, 0-9 Units, Subcutaneous, 4x Daily With Meals & Nightly  ipratropium-albuterol, 3 mL, Nebulization, 4x Daily - RT  midodrine, 5 mg, Oral, TID AC  nicotine, 1 patch, Transdermal, Q24H  pantoprazole, 40 mg, Oral, Q AM  pharmacy consult - [place], , Does not apply, Daily  prasugrel, 5 mg, Oral, Daily  rosuvastatin, 20 mg, Oral, Nightly        Objective     Vital Sign Min/Max for last 24 hours  Temp  Min: 97.3 °F (36.3 °C)  Max: 98.7 °F (37.1 °C)   BP  Min: 68/43  Max: 148/88   Pulse  Min: 61  Max: 110   Resp  Min: 14  Max: 20   SpO2  Min: 89 %  Max: 97 %   Flow (L/min)  Min: 2  Max: 3   No data recorded      Intake/Output Summary (Last 24 hours) at 1/3/2023 0759  Last data filed at 1/3/2023 0600  Gross per 24 hour   Intake 1590 ml   Output 2050 ml   Net -460 ml      Physical Exam:  CONSTITUTIONAL: No acute distress  CARDIOVASCULAR: Regular rate and rhythm with normal S1 and S2. Without murmur.  PERIPHERAL VASCULAR: Normal radial pulse. There is no lower extremity edema bilaterally.    Results Review:     Tele: Sinus rhythm    Labs:    Results for orders placed during the hospital encounter of 12/30/22    Adult Transthoracic Echo Complete w/ Color, Spectral and Contrast if Necessary Per Protocol    Interpretation Summary  •  Left ventricular systolic function is moderately decreased. Left ventricular ejection fraction appears to be 31 - 35%.  •  The following left ventricular wall segments are hypokinetic: apical lateral, basal inferolateral, mid inferolateral, apical inferior, mid inferior and basal inferior.  •  The right ventricular cavity is mildly  dilated.  •  Mild mitral valve regurgitation is present.  •  Mild tricuspid valve regurgitation is present.  •  Estimated right ventricular systolic pressure from tricuspid regurgitation is mildly elevated (35-45 mmHg).      Assessment   Assessment:  1. Inferior STEMI, CAD  a. Status post LOGAN x1 to 100% RCA  2. Complete heart block  a. Status post temporary pacemaker wire during heart cath.  Removed prior to transfer to ICU  3. Intraprocedural ventricular fibrillation  a. Status post defibrillation x1 intra procedurally, conversion to sinus rhythm  b. On amiodarone briefly.  No discontinued.  4. Acute systolic heart failure, RV dilatation, possible RV dysfunction  a. EF 45% with inferior hypokinesis  5. Recent influenza  6. Tobacco use, discontinued 1 month ago    Plan:  1. Wean midodrine to maintain a MAP goal of greater than 60 mmHg  2. Trial of Yonathan-Synephrine in place of dopamine  3. Added midodrine this morning.  Increasing to 10 mg 3 times daily.  4. Continue aspirin and Effient therapy status post stent to the RCA. (Maintenance dose 5 mg daily as age greater than 75)   5. High intensity statin  6. Beta-blocker when blood pressure allows off of vasopressor  7. ACEI/ARB/ARNi/MRA/SGLT2i when blood pressure allows   8. PT consult  9. Transfer to telemetry when off of vasopressors  10. Repeat echocardiogram in 1 to 2 days to reassess RV and LV function.      Kieran Reddy MD  01/03/23  07:59 EST

## 2023-01-03 NOTE — PROGRESS NOTES
INTENSIVIST   PROGRESS NOTE        SUBJECTIVE     Tucker 77 y.o. male is followed for: No chief complaint on file.       I21.19, STEMI S/P LOGAN RCA 22    PEA arrest (HCC)    Acute systolic heart failure (HCC)    Intraprocedural VF (ventricular fibrillation) (HCC)    Intraprocedural Complete heart block (HCC)    Tobacco use    COPD (chronic obstructive pulmonary disease) (HCC)    GERD (gastroesophageal reflux disease)    Chronic hypoxemic respiratory failure (HCC)    Prediabetes    RICKIE (acute kidney injury) on probable CKD    As an Intensivist, we provide an integrated approach to the ICU patient and family, medical management of comorbid conditions, including but not limited to electrolytes, glycemic control, organ dysfunction, lead interdisciplinary rounds and coordinate the care with all other services, including those from other specialists.     Interval History:  POD: 4 Days Post-Op    No acute events overnight.    The patient qualifies to receive the vaccine, but they have not yet received it.     Temp  Min: 97.8 °F (36.6 °C)  Max: 98.6 °F (37 °C)       History     Last Reviewed by Marcos Judge MD on 1/3/2023 at  6:34 PM    Sections Reviewed    Medical, Family, Surgical, Tobacco, Alcohol, Drug Use, Sexual Activity,   Social Documentation      Problem list reviewed by Marcos Judge MD on 1/3/2023 at  6:34 PM  Medicines reviewed by Marcos Judge MD on 1/3/2023 at  6:34 PM  Allergies reviewed by Marcos Judge MD on 1/3/2023 at  6:33 PM       The patient's relevant past medical, surgical and social history were reviewed and updated in Epic as appropriate.        OBJECTIVE     Vitals:  Temp: 97.8 °F (36.6 °C) (23 1600) Temp  Min: 97.8 °F (36.6 °C)  Max: 98.6 °F (37 °C)   Temp core:      BP: (!) 84/54 (23 1700) BP  Min: 69/44  Max: 119/76   MAP (non-invasive) Noninvasive MAP (mmHg): 59 (23 1700) Noninvasive MAP (mmHg)  Av.2  Min: 50  Max: 108   Pulse: 78 (23 1712) Pulse  Min:  65  Max: 105   Resp: 16 (01/03/23 1712) Resp  Min: 14  Max: 18   SpO2: 94 % (01/03/23 1712) SpO2  Min: 89 %  Max: 97 %   Device: humidified, nasal cannula (01/03/23 1712)    Flow Rate: 2 (01/03/23 1712) Flow (L/min)  Min: 2  Max: 2         12/30/22 1847 12/30/22 1848   Weight: 59 kg (130 lb 1.1 oz) 59 kg (130 lb 1.1 oz)        Intake/Ouptut 24 hrs (7:00AM - 6:59 AM)  Intake & Output (last 3 days)       12/31 0701 01/01 0700 01/01 0701 01/02 0700 01/02 0701 01/03 0700 01/03 0701 01/04 0700    P.O. 360 480 480     I.V. (mL/kg) 546 (9.3) 1431 (24.3) 1110 (18.8)     Total Intake(mL/kg) 906 (15.4) 1911 (32.4) 1590 (26.9)     Urine (mL/kg/hr) 875 (0.6) 1600 (1.1) 2050 (1.4) 650 (1)    Stool 0       Total Output 875 1600 2050 650    Net +31 +311 -460 -650            Urine Unmeasured Occurrence 1 x       Stool Unmeasured Occurrence 1 x             Medications (drips):  DOPamine, Last Rate: Stopped (01/03/23 1216)  phenylephrine, Last Rate: 0.2 mcg/kg/min (01/03/23 1347)      Physical Examination  Telemetry:  Rhythm: normal sinus rhythm (01/03/23 1800)  Atrial Rhythm: atrial wandering pacemaker (01/02/23 1400)      Constitutional:  No acute distress.   Cardiovascular: RRR.    Respiratory: Normal breath sounds  No adventitious sounds.   Abdominal:  Soft with no tenderness.   Extremities: No Edema   Neurological:   Alert, Oriented, Cooperative.  Best Eye Response: 4-->(E4) spontaneous (01/03/23 1800)  Best Motor Response: 6-->(M6) obeys commands (01/03/23 1800)  Best Verbal Response: 5-->(V5) oriented (01/03/23 1800)  [place] Coma Scale Score: 15 (01/03/23 1800)     Results Reviewed:  Laboratory  Microbiology  Radiology  Pathology    Hematology:  Results from last 7 days   Lab Units 01/03/23  0336 01/02/23  0347 01/01/23  0342   WBC 10*3/mm3 9.28 9.85 9.99   HEMOGLOBIN g/dL 10.5* 12.1* 13.1   MCV fL 99.1* 97.9* 97.1*   PLATELETS 10*3/mm3 382 425 460*   NEUTROS ABS 10*3/mm3 6.10  --  5.78   LYMPHS ABS 10*3/mm3 2.10  --   2.91   EOS ABS 10*3/mm3 0.16  --  0.11       Chemistry:  Estimated Creatinine Clearance: 46 mL/min (by C-G formula based on SCr of 1.14 mg/dL).    Results from last 7 days   Lab Units 01/03/23  0336 01/02/23  0347   SODIUM mmol/L 139 138   POTASSIUM mmol/L 4.6 4.6   CHLORIDE mmol/L 106 106   CO2 mmol/L 24.0 24.0   BUN mg/dL 27* 25*   CREATININE mg/dL 1.14 1.31*   GLUCOSE mg/dL 120* 137*     Results from last 7 days   Lab Units 01/03/23  0336 01/02/23  0347 01/01/23  0342   CALCIUM mg/dL 8.6 8.1* 8.0*   MAGNESIUM mg/dL  --  2.8* 2.0   PHOSPHORUS mg/dL  --  3.0 2.4*       Images:  No radiology results for the last day    Echo:  Results for orders placed during the hospital encounter of 12/30/22    Adult Transthoracic Echo Complete w/ Color, Spectral and Contrast if Necessary Per Protocol    Interpretation Summary  •  Left ventricular systolic function is moderately decreased. Left ventricular ejection fraction appears to be 31 - 35%.  •  The following left ventricular wall segments are hypokinetic: apical lateral, basal inferolateral, mid inferolateral, apical inferior, mid inferior and basal inferior.  •  The right ventricular cavity is mildly dilated.  •  Mild mitral valve regurgitation is present.  •  Mild tricuspid valve regurgitation is present.  •  Estimated right ventricular systolic pressure from tricuspid regurgitation is mildly elevated (35-45 mmHg).      Results: Reviewed.  I reviewed the patient's new laboratory and imaging results.  I independently reviewed the patient's new images.    Medications: Reviewed.    Assessment   A/P     Hospital:  LOS: 4 days   ICU: 4d 2h      Diagnosis   • **I21.19, STEMI S/P LOGAN RCA 12/30/22 [I21.19]   • PEA arrest (HCC) [I46.9]   • Acute systolic heart failure (HCC) [I50.21]   • Intraprocedural VF (ventricular fibrillation) ([facility]) [I49.01]   • Intraprocedural Complete heart block (HCC) [I44.2]   • Tobacco use [Z72.0]   • COPD (chronic obstructive pulmonary disease) ([facility])  [J44.9]   • GERD (gastroesophageal reflux disease) [K21.9]   • Chronic hypoxemic respiratory failure (HCC) [J96.11]       • Prediabetes [R73.03]   • RICKIE (acute kidney injury) on probable CKD [N17.9]     Tucker is a 77 y.o. male admitted on 2022 with STEMI (ST elevation myocardial infarction) (HCC) [I21.3]    Assessment/Management/Treatment Plan:    STEMI - inferior wall.  S/P LOGAN RCA 2022. Intraprocedure V Fib and complete heart block.  Hypotension - Dopamine continuous intravenous infusion   HFrEF  Pulmonary   COPD no exacerbation  Home Oxygen (2L/m)  Tobacco use but quit 1 month prior to admission  GI/Hepatology  GERD  Renal  RICKIE    Endocrine  Body mass index is 21.64 kg/m². Normal: 18.5-24.9kg/m2  At risk for DM (pre-diabetes) based on his HbA1c. [HbA1C 5.7%-6.4%, eA-139 mg/dL].     Lab Results   Lab Value Date/Time    HGBA1C 6.10 (H) 2022 1609     Results from last 7 days   Lab Units 23  1559 23  1128 23  0710 23  1549 23  1057 23  0715 23   GLUCOSE mg/dL 113 117 106 124 114 121 123 136*       Diet: Diet: Cardiac Diets, Diabetic Diets; Healthy Heart (2-3 Na+); Consistent Carbohydrate; Texture: Regular Texture (IDDSI 7); Fluid Consistency: Thin (IDDSI 0)  No active supplement orders      Advance Directives: Code Status and Medical Interventions:   Ordered at: 22 1538     Level Of Support Discussed With:    Patient     Code Status (Patient has no pulse and is not breathing):    CPR (Attempt to Resuscitate)     Medical Interventions (Patient has pulse or is breathing):    Full        DVT prophylaxis:  No DVT prophylaxis order currently exists.     In brief:  Wean DA continuous intravenous infusion off as tolerated. Neosynephrine started per cardiology  Midodrine started by Cardiology  Disposition: Keep in ICU.    Plan of care and goals reviewed during interdisciplinary rounds.  I discussed the patient's findings and my  recommendations with patient and nursing staff    Time: I spent 35 minutes.    Time includes time spent before, during and after visit on the day of the encounter, evaluating patient, reviewing records and ordering labs or other diagnostic studies, communicating with other health care professionals, documenting the encounter and coordinating care.    This is non-concurrent time.           [] Primary Attending Intensive Care Medicine - Nutrition Support   [x] Consultant

## 2023-01-03 NOTE — CASE MANAGEMENT/SOCIAL WORK
Continued Stay Note  [facility]     Patient Name: Tucker Serrano  MRN: [id number]  Today's Date: 1/3/2023    Admit Date: 12/30/2022    Plan: home   Discharge Plan     Row Name 01/03/23 1209       Plan    Plan home    Patient/Family in Agreement with Plan yes    Plan Comments Mr. Serrano is post stemi with Vfib in cath lab now with pressor for B/P support.  PT has been ordered will await their eval to assist with final discharge plans.    Final Discharge Disposition Code 30 - still a patient               Discharge Codes    No documentation.               Expected Discharge Date and Time     Expected Discharge Date Expected Discharge Time    Jan 6, 2023             Lilia Sheth RN

## 2023-01-03 NOTE — THERAPY EVALUATION
Patient Name: Tucker Ambrosio  : 1945    MRN: [id number]                              Today's Date: 1/3/2023       Admit Date: 2022    Visit Dx: No diagnosis found.  Patient Active Problem List   Diagnosis   • PEA arrest (HCC)   • I21.19, STEMI S/P LOGAN RCA 22   • Acute systolic heart failure (HCC)   • Intraprocedural VF (ventricular fibrillation) (HCC)   • Intraprocedural Complete heart block (HCC)   • STEMI (ST elevation myocardial infarction) ([facility])   • Tobacco use   • COPD (chronic obstructive pulmonary disease) (HCC)   • GERD (gastroesophageal reflux disease)   • Chronic hypoxemic respiratory failure (HCC)   • Prediabetes   • RICKIE (acute kidney injury) on probable CKD     Past Medical History:   Diagnosis Date   • Chronic hypoxemic respiratory failure (HCC)    • COPD (chronic obstructive pulmonary disease) (HCC)    • GERD (gastroesophageal reflux disease)      Past Surgical History:   Procedure Laterality Date   • APPENDECTOMY     • SHOULDER SURGERY     • SKIN CANCER EXCISION        General Information     Row Name 23 1602          Physical Therapy Time and Intention    Document Type evaluation  -     Mode of Treatment physical therapy  -     Row Name 23 1602          General Information    Patient Profile Reviewed yes  -     Prior Level of Function independent:;all household mobility;community mobility;gait;transfer;bed mobility  states use of front wheeled walker most of the time at home, reports having home health, reports using O2 at home  -     Existing Precautions/Restrictions fall;oxygen therapy device and L/min;cardiac  -     Barriers to Rehab medically complex  -     Row Name 23 1602          Living Environment    People in Home alone  -     Row Name 23 1602          Home Main Entrance    Number of Stairs, Main Entrance four  -     Stair Railings, Main Entrance railing on right side (ascending)  -     Row Name 23 1602          Stairs Within  Home, Primary    Number of Stairs, Within Home, Primary none  -HM     Row Name 01/03/23 1602          Cognition    Orientation Status (Cognition) oriented x 4  -HM     Row Name 01/03/23 1602          Safety Issues, Functional Mobility    Safety Issues Affecting Function (Mobility) safety precautions follow-through/compliance;insight into deficits/self-awareness;safety precaution awareness;awareness of need for assistance  -     Impairments Affecting Function (Mobility) balance;endurance/activity tolerance;strength  -HM           User Key  (r) = Recorded By, (t) = Taken By, (c) = Cosigned By    Initials Name Provider Type     Jacklyn Rangel PT Physical Therapist               Mobility     Row Name 01/03/23 1603          Sit-Stand Transfer    Sit-Stand [place] (Transfers) minimum assist (75% patient effort);1 person assist;1 person to manage equipment  -HM     Assistive Device (Sit-Stand Transfers) walker, front-wheeled  -HM     Row Name 01/03/23 1603          Gait/Stairs (Locomotion)    [place] Level (Gait) minimum assist (75% patient effort);1 person assist;1 person to manage equipment  -HM     Assistive Device (Gait) walker, front-wheeled  -HM     Distance in Feet (Gait) 220  -HM     Deviations/Abnormal Patterns (Gait) bilateral deviations;mynor decreased;gait speed decreased;stride length decreased  -     Bilateral Gait Deviations heel strike decreased;forward flexed posture  -     [place] Level (Stairs) not tested  -     Comment, (Gait/Stairs) Pt required cueing for upright posture and maintaining close proximity to front wheeled walker for safety. Unsteadiness noted intermittently throughout ambulation. Mobility limited d/t fatigue  -HM           User Key  (r) = Recorded By, (t) = Taken By, (c) = Cosigned By    Initials Name Provider Type     Jacklyn Rangel PT Physical Therapist               Obj/Interventions     Row Name 01/03/23 1605          Range of Motion Comprehensive     General Range of Motion bilateral lower extremity ROM WFL  -HM     Row Name 01/03/23 1605          Strength Comprehensive (MMT)    General Manual Muscle Testing (MMT) Assessment lower extremity strength deficits identified  -HM     Comment, General Manual Muscle Testing (MMT) Assessment BLE grossly 4/5  -HM     Row Name 01/03/23 1605          Balance    Balance Assessment sitting static balance;sit to stand dynamic balance;sitting dynamic balance;standing static balance;standing dynamic balance  -HM     Static Sitting Balance standby assist  -HM     Dynamic Sitting Balance standby assist  -HM     Position, Sitting Balance sitting in chair  -     Sit to Stand Dynamic Balance minimal assist;1-person assist  -HM     Static Standing Balance contact guard  -HM     Dynamic Standing Balance minimal assist  -HM     Position/Device Used, Standing Balance supported;walker, front-wheeled  -     Row Name 01/03/23 1605          Sensory Assessment (Somatosensory)    Sensory Assessment (Somatosensory) LE sensation intact  -           User Key  (r) = Recorded By, (t) = Taken By, (c) = Cosigned By    Initials Name Provider Type     Jacklyn Rangel, PT Physical Therapist               Goals/Plan     Row Name 01/03/23 1609          Bed Mobility Goal 1 (PT)    Activity/Assistive Device (Bed Mobility Goal 1, PT) sit to supine/supine to sit  -HM     [place] Level/Cues Needed (Bed Mobility Goal 1, PT) standby assist  -HM     Time Frame (Bed Mobility Goal 1, PT) long term goal (LTG);10 days  -HM     Progress/Outcomes (Bed Mobility Goal 1, PT) new goal  -     Row Name 01/03/23 1600          Transfer Goal 1 (PT)    Activity/Assistive Device (Transfer Goal 1, PT) sit-to-stand/stand-to-sit  -HM     [place] Level/Cues Needed (Transfer Goal 1, PT) standby assist  -HM     Time Frame (Transfer Goal 1, PT) long term goal (LTG);10 days  -     Progress/Outcome (Transfer Goal 1, PT) new goal  -     Row Name 01/03/23 1605           Gait Training Goal 1 (PT)    Activity/Assistive Device (Gait Training Goal 1, PT) gait (walking locomotion);assistive device use  -HM     [place] Level (Gait Training Goal 1, PT) standby assist  -HM     Time Frame (Gait Training Goal 1, PT) long term goal (LTG);10 days  -HM     Strategies/Barriers (Gait Training Goal 1, PT) 450  -HM     Progress/Outcome (Gait Training Goal 1, PT) new goal  -     Row Name 01/03/23 1609          Stairs Goal 1 (PT)    Activity/Assistive Device (Stairs Goal 1, PT) ascending stairs;descending stairs  -HM     [place] Level/Cues Needed (Stairs Goal 1, PT) standby assist  -HM     Number of Stairs (Stairs Goal 1, PT) 4  -HM     Time Frame (Stairs Goal 1, PT) long term goal (LTG);10 days  -HM     Progress/Outcome (Stairs Goal 1, PT) new goal  -     Row Name 01/03/23 1608          Therapy Assessment/Plan (PT)    Planned Therapy Interventions (PT) balance training;bed mobility training;gait training;home exercise program;postural re-education;patient/family education;neuromuscular re-education;transfer training;strengthening;stair training  -           User Key  (r) = Recorded By, (t) = Taken By, (c) = Cosigned By    Initials Name Provider Type     Jacklyn Rangel, PT Physical Therapist               Clinical Impression     Row Name 01/03/23 1605          Pain    Pretreatment Pain Rating 0/10 - no pain  -     Posttreatment Pain Rating 0/10 - no pain  -     Row Name 01/03/23 1605          Plan of Care Review    Plan of Care Reviewed With patient  -     Progress no change  -     Outcome Evaluation PT eval completed. Pt ambulated 220 feet with Min A x1 with FWx with cueing for upright posture and maintaining close proximity to walker for safety. Pt demonstrated balance deficits, strength deficits, and decreased functional endurance at this time. d/c rec home with assist pending progress with mobility. Rec stair training next PT session.  -     Row Name 01/03/23 1607           Therapy Assessment/Plan (PT)    Rehab Potential (PT) good, to achieve stated therapy goals  -     Criteria for Skilled Interventions Met (PT) yes;meets criteria;skilled treatment is necessary  -     Therapy Frequency (PT) daily  -     Row Name 01/03/23 1605          Vital Signs    Pre Systolic BP Rehab 81  VSS, RN cleared PT eval  -     Pre Treatment Diastolic BP 56  -HM     Post Systolic BP Rehab 94  -HM     Post Treatment Diastolic BP 57  -HM     Pretreatment Heart Rate (beats/min) 85  -HM     Pre SpO2 (%) 91  -HM     O2 Delivery Pre Treatment nasal cannula  -HM     O2 Delivery Intra Treatment nasal cannula  -HM     O2 Delivery Post Treatment nasal cannula  -HM     Pre Patient Position Sitting  -     Intra Patient Position Standing  -HM     Post Patient Position Sitting  -     Row Name 01/03/23 1605          Positioning and Restraints    Pre-Treatment Position sitting in chair/recliner  -     Post Treatment Position chair  -HM     In Chair notified nsg;sitting;reclined;waffle cushion;call light within reach;encouraged to call for assist;LUE elevated;RUE elevated;heels elevated  -           User Key  (r) = Recorded By, (t) = Taken By, (c) = Cosigned By    Initials Name Provider Type     Jacklyn Rangel, PT Physical Therapist               Outcome Measures     Row Name 01/03/23 1610          How much help from another person do you currently need...    Turning from your back to your side while in flat bed without using bedrails? 3  -HM     Moving from lying on back to sitting on the side of a flat bed without bedrails? 3  -HM     Moving to and from a bed to a chair (including a wheelchair)? 3  -HM     Standing up from a chair using your arms (e.g., wheelchair, bedside chair)? 3  -HM     Climbing 3-5 steps with a railing? 3  -HM     To walk in hospital room? 3  -HM     AM-PAC 6 Clicks Score (PT) 18  -HM     Highest level of mobility 6 --> Walked 10 steps or more  -     Row Name 01/03/23  1610          Functional Assessment    Outcome Measure Options AM-PAC 6 Clicks Basic Mobility (PT)  -           User Key  (r) = Recorded By, (t) = Taken By, (c) = Cosigned By    Initials Name Provider Type     Jacklyn Rangel PT Physical Therapist                             Physical Therapy Education     Title: PT OT SLP Therapies (In Progress)     Topic: Physical Therapy (In Progress)     Point: Mobility training (Done)     Learning Progress Summary           Patient Acceptance, E,TB, VU,NR by  at 1/3/2023 1610                   Point: Home exercise program (Not Started)     Learner Progress:  Not documented in this visit.          Point: Body mechanics (Done)     Learning Progress Summary           Patient Acceptance, E,TB, VU,NR by  at 1/3/2023 1610                   Point: Precautions (Done)     Learning Progress Summary           Patient Acceptance, E,TB, VU,NR by  at 1/3/2023 1610                               User Key     Initials Effective Dates Name Provider Type Discipline     09/22/22 -  Jacklyn Rangel PT Physical Therapist PT              PT Recommendation and Plan  Planned Therapy Interventions (PT): balance training, bed mobility training, gait training, home exercise program, postural re-education, patient/family education, neuromuscular re-education, transfer training, strengthening, stair training  Plan of Care Reviewed With: patient  Progress: no change  Outcome Evaluation: PT eval completed. Pt ambulated 220 feet with Min A x1 with FWx with cueing for upright posture and maintaining close proximity to walker for safety. Pt demonstrated balance deficits, strength deficits, and decreased functional endurance at this time. d/c rec home with assist pending progress with mobility. Rec stair training next PT session.     Time Calculation:    PT Charges     Row Name 01/03/23 1611             Time Calculation    Start Time 1515  -      PT Received On 01/03/23  -      PT Goal Re-Cert  Due Date 01/13/23  -HM         Untimed Charges    PT Eval/Re-eval Minutes 46  -HM         Total Minutes    Untimed Charges Total Minutes 46  -HM       Total Minutes 46  -HM            User Key  (r) = Recorded By, (t) = Taken By, (c) = Cosigned By    Initials Name Provider Type     Jacklyn Rangel, PT Physical Therapist              Therapy Charges for Today     Code Description Service Date Service Provider Modifiers Qty    42340393699 HC PT EVAL MOD COMPLEXITY 4 1/3/2023 Jacklyn Rangel, PT GP 1    38253966211 HC PT THER SUPP EA 15 MIN 1/3/2023 Jacklyn Rangel, PT GP 2          PT G-Codes  Outcome Measure Options: AM-PAC 6 Clicks Basic Mobility (PT)  AM-PAC 6 Clicks Score (PT): 18  PT Discharge Summary  Anticipated Discharge Disposition (PT): home with assist    Jacklyn Rangel, PT  1/3/2023

## 2023-01-03 NOTE — NURSING NOTE
Pt. Referred for Phase II Cardiac Rehab. Staff discussed benefits of exercise, program protocol, and educational material provided. Teach back verified.  Permission granted from patient for staff to fax referral information to outlying program at this time.  Staff faxed referral info to [facility].

## 2023-01-03 NOTE — PLAN OF CARE
Goal Outcome Evaluation:  Plan of Care Reviewed With: patient        Progress: no change  Outcome Evaluation: PT eval completed. Pt ambulated 220 feet with Min A x1 with FWx with cueing for upright posture and maintaining close proximity to walker for safety. Pt demonstrated balance deficits, strength deficits, and decreased functional endurance at this time. d/c rec home with assist pending progress with mobility. Rec stair training next PT session.

## 2023-01-04 ENCOUNTER — APPOINTMENT (OUTPATIENT)
Dept: GENERAL RADIOLOGY | Facility: HOSPITAL | Age: 78
DRG: 246 | End: 2023-01-04
Payer: MEDICARE

## 2023-01-04 LAB
ANION GAP SERPL CALCULATED.3IONS-SCNC: 9 MMOL/L (ref 5–15)
BASOPHILS # BLD AUTO: 0.06 10*3/MM3 (ref 0–0.2)
BASOPHILS NFR BLD AUTO: 0.4 % (ref 0–1.5)
BUN SERPL-MCNC: 31 MG/DL (ref 8–23)
BUN/CREAT SERPL: 27.9 (ref 7–25)
CALCIUM SPEC-SCNC: 8.4 MG/DL (ref 8.6–10.5)
CHLORIDE SERPL-SCNC: 105 MMOL/L (ref 98–107)
CO2 SERPL-SCNC: 23 MMOL/L (ref 22–29)
CORTIS SERPL-MCNC: 13.51 MCG/DL
CREAT SERPL-MCNC: 1.11 MG/DL (ref 0.76–1.27)
DEPRECATED RDW RBC AUTO: 53.6 FL (ref 37–54)
EGFRCR SERPLBLD CKD-EPI 2021: 68.8 ML/MIN/1.73
EOSINOPHIL # BLD AUTO: 0.25 10*3/MM3 (ref 0–0.4)
EOSINOPHIL NFR BLD AUTO: 1.8 % (ref 0.3–6.2)
ERYTHROCYTE [DISTWIDTH] IN BLOOD BY AUTOMATED COUNT: 14.7 % (ref 12.3–15.4)
GLUCOSE BLDC GLUCOMTR-MCNC: 103 MG/DL (ref 70–130)
GLUCOSE BLDC GLUCOMTR-MCNC: 115 MG/DL (ref 70–130)
GLUCOSE BLDC GLUCOMTR-MCNC: 96 MG/DL (ref 70–130)
GLUCOSE BLDC GLUCOMTR-MCNC: 97 MG/DL (ref 70–130)
GLUCOSE SERPL-MCNC: 101 MG/DL (ref 65–99)
HCT VFR BLD AUTO: 32.9 % (ref 37.5–51)
HGB BLD-MCNC: 10.6 G/DL (ref 13–17.7)
IMM GRANULOCYTES # BLD AUTO: 0.18 10*3/MM3 (ref 0–0.05)
IMM GRANULOCYTES NFR BLD AUTO: 1.3 % (ref 0–0.5)
LYMPHOCYTES # BLD AUTO: 3.74 10*3/MM3 (ref 0.7–3.1)
LYMPHOCYTES NFR BLD AUTO: 26.4 % (ref 19.6–45.3)
MAGNESIUM SERPL-MCNC: 1.7 MG/DL (ref 1.6–2.4)
MCH RBC QN AUTO: 31.8 PG (ref 26.6–33)
MCHC RBC AUTO-ENTMCNC: 32.2 G/DL (ref 31.5–35.7)
MCV RBC AUTO: 98.8 FL (ref 79–97)
MONOCYTES # BLD AUTO: 1.23 10*3/MM3 (ref 0.1–0.9)
MONOCYTES NFR BLD AUTO: 8.7 % (ref 5–12)
NEUTROPHILS NFR BLD AUTO: 61.4 % (ref 42.7–76)
NEUTROPHILS NFR BLD AUTO: 8.73 10*3/MM3 (ref 1.7–7)
NRBC BLD AUTO-RTO: 0 /100 WBC (ref 0–0.2)
NT-PROBNP SERPL-MCNC: 7687 PG/ML (ref 0–1800)
PHOSPHATE SERPL-MCNC: 2.6 MG/DL (ref 2.5–4.5)
PLATELET # BLD AUTO: 403 10*3/MM3 (ref 140–450)
PMV BLD AUTO: 9.9 FL (ref 6–12)
POTASSIUM SERPL-SCNC: 4.7 MMOL/L (ref 3.5–5.2)
RBC # BLD AUTO: 3.33 10*6/MM3 (ref 4.14–5.8)
SODIUM SERPL-SCNC: 137 MMOL/L (ref 136–145)
WBC NRBC COR # BLD: 14.19 10*3/MM3 (ref 3.4–10.8)

## 2023-01-04 PROCEDURE — 97110 THERAPEUTIC EXERCISES: CPT

## 2023-01-04 PROCEDURE — 97530 THERAPEUTIC ACTIVITIES: CPT

## 2023-01-04 PROCEDURE — 99232 SBSQ HOSP IP/OBS MODERATE 35: CPT | Performed by: INTERNAL MEDICINE

## 2023-01-04 PROCEDURE — 85025 COMPLETE CBC W/AUTO DIFF WBC: CPT | Performed by: INTERNAL MEDICINE

## 2023-01-04 PROCEDURE — 94664 DEMO&/EVAL PT USE INHALER: CPT

## 2023-01-04 PROCEDURE — 94799 UNLISTED PULMONARY SVC/PX: CPT

## 2023-01-04 PROCEDURE — 84100 ASSAY OF PHOSPHORUS: CPT | Performed by: INTERNAL MEDICINE

## 2023-01-04 PROCEDURE — 71045 X-RAY EXAM CHEST 1 VIEW: CPT

## 2023-01-04 PROCEDURE — 80048 BASIC METABOLIC PNL TOTAL CA: CPT | Performed by: INTERNAL MEDICINE

## 2023-01-04 PROCEDURE — P9041 ALBUMIN (HUMAN),5%, 50ML: HCPCS | Performed by: INTERNAL MEDICINE

## 2023-01-04 PROCEDURE — 83735 ASSAY OF MAGNESIUM: CPT | Performed by: INTERNAL MEDICINE

## 2023-01-04 PROCEDURE — 25010000002 DIGOXIN PER 500 MCG: Performed by: INTERNAL MEDICINE

## 2023-01-04 PROCEDURE — 82962 GLUCOSE BLOOD TEST: CPT

## 2023-01-04 PROCEDURE — 83880 ASSAY OF NATRIURETIC PEPTIDE: CPT | Performed by: INTERNAL MEDICINE

## 2023-01-04 PROCEDURE — 94761 N-INVAS EAR/PLS OXIMETRY MLT: CPT

## 2023-01-04 PROCEDURE — 0 MAGNESIUM SULFATE 4 GM/100ML SOLUTION: Performed by: NURSE PRACTITIONER

## 2023-01-04 PROCEDURE — 99291 CRITICAL CARE FIRST HOUR: CPT | Performed by: INTERNAL MEDICINE

## 2023-01-04 PROCEDURE — 82533 TOTAL CORTISOL: CPT | Performed by: INTERNAL MEDICINE

## 2023-01-04 PROCEDURE — 25010000002 ALBUMIN HUMAN 5% PER 50 ML: Performed by: INTERNAL MEDICINE

## 2023-01-04 RX ORDER — DIGOXIN 0.25 MG/ML
500 INJECTION INTRAMUSCULAR; INTRAVENOUS ONCE
Status: DISCONTINUED | OUTPATIENT
Start: 2023-01-04 | End: 2023-01-04

## 2023-01-04 RX ORDER — ALBUMIN, HUMAN INJ 5% 5 %
250 SOLUTION INTRAVENOUS ONCE
Status: COMPLETED | OUTPATIENT
Start: 2023-01-04 | End: 2023-01-04

## 2023-01-04 RX ORDER — DIGOXIN 250 MCG
250 TABLET ORAL
Status: DISCONTINUED | OUTPATIENT
Start: 2023-01-05 | End: 2023-01-05

## 2023-01-04 RX ORDER — DIGOXIN 0.25 MG/ML
250 INJECTION INTRAMUSCULAR; INTRAVENOUS ONCE
Status: COMPLETED | OUTPATIENT
Start: 2023-01-04 | End: 2023-01-04

## 2023-01-04 RX ADMIN — DIGOXIN 250 MCG: 0.25 INJECTION INTRAMUSCULAR; INTRAVENOUS at 11:40

## 2023-01-04 RX ADMIN — MIDODRINE HYDROCHLORIDE 10 MG: 10 TABLET ORAL at 08:08

## 2023-01-04 RX ADMIN — MIDODRINE HYDROCHLORIDE 10 MG: 10 TABLET ORAL at 17:10

## 2023-01-04 RX ADMIN — DIGOXIN 250 MCG: 0.25 INJECTION INTRAMUSCULAR; INTRAVENOUS at 17:10

## 2023-01-04 RX ADMIN — Medication 1 PATCH: at 08:09

## 2023-01-04 RX ADMIN — PANTOPRAZOLE SODIUM 40 MG: 40 TABLET, DELAYED RELEASE ORAL at 06:25

## 2023-01-04 RX ADMIN — ROSUVASTATIN 20 MG: 20 TABLET, FILM COATED ORAL at 20:56

## 2023-01-04 RX ADMIN — ALBUMIN (HUMAN) 250 ML: 12.5 INJECTION, SOLUTION INTRAVENOUS at 10:40

## 2023-01-04 RX ADMIN — IPRATROPIUM BROMIDE AND ALBUTEROL SULFATE 3 ML: 2.5; .5 SOLUTION RESPIRATORY (INHALATION) at 12:50

## 2023-01-04 RX ADMIN — IPRATROPIUM BROMIDE AND ALBUTEROL SULFATE 3 ML: 2.5; .5 SOLUTION RESPIRATORY (INHALATION) at 16:21

## 2023-01-04 RX ADMIN — PRASUGREL 5 MG: 10 TABLET, FILM COATED ORAL at 08:08

## 2023-01-04 RX ADMIN — MAGNESIUM SULFATE HEPTAHYDRATE 4 G: 40 INJECTION, SOLUTION INTRAVENOUS at 04:54

## 2023-01-04 RX ADMIN — IPRATROPIUM BROMIDE AND ALBUTEROL SULFATE 3 ML: 2.5; .5 SOLUTION RESPIRATORY (INHALATION) at 08:54

## 2023-01-04 RX ADMIN — IPRATROPIUM BROMIDE AND ALBUTEROL SULFATE 3 ML: 2.5; .5 SOLUTION RESPIRATORY (INHALATION) at 21:25

## 2023-01-04 RX ADMIN — ASPIRIN 81 MG: 81 TABLET, COATED ORAL at 08:08

## 2023-01-04 RX ADMIN — MIDODRINE HYDROCHLORIDE 10 MG: 10 TABLET ORAL at 11:40

## 2023-01-04 NOTE — PROGRESS NOTES
INTENSIVIST   PROGRESS NOTE        SUBJECTIVE     Tucker 77 y.o. male is followed for: No chief complaint on file.       I21.19, STEMI S/P LOGAN RCA 22    PEA arrest (HCC)    Acute systolic heart failure (HCC)    Intraprocedural VF (ventricular fibrillation) (HCC)    Intraprocedural Complete heart block (HCC)    Tobacco use    COPD (chronic obstructive pulmonary disease) (HCC)    GERD (gastroesophageal reflux disease)    Chronic hypoxemic respiratory failure (HCC)    Prediabetes    RICKIE (acute kidney injury) on probable CKD    As an Intensivist, we provide an integrated approach to the ICU patient and family, medical management of comorbid conditions, including but not limited to electrolytes, glycemic control, organ dysfunction, lead interdisciplinary rounds and coordinate the care with all other services, including those from other specialists.     Interval History:  POD: 5 Days Post-Op    No acute events overnight.     Back on pressors. Yonathan-synephrine.    The patient qualifies to receive the vaccine, but they have not yet received it.     Temp  Min: 96.7 °F (35.9 °C)  Max: 98.7 °F (37.1 °C)       History     Last Reviewed by Marcos Judge MD on 1/3/2023 at  6:34 PM    Sections Reviewed    Medical, Family, Surgical, Tobacco, Alcohol, Drug Use, Sexual Activity,   Social Documentation      Problem list reviewed by Marcos Judge MD on 1/3/2023 at  6:34 PM  Medicines reviewed by Marcos Judge MD on 1/3/2023 at  6:34 PM  Allergies reviewed by Marcos Judge MD on 1/3/2023 at  6:33 PM       The patient's relevant past medical, surgical and social history were reviewed and updated in Epic as appropriate.        OBJECTIVE     Vitals:  Temp: 98.7 °F (37.1 °C) (23 1600) Temp  Min: 96.7 °F (35.9 °C)  Max: 98.7 °F (37.1 °C)   Temp core:      BP: 93/60 (23 1615) BP  Min: 67/45  Max: 114/69   MAP (non-invasive) Noninvasive MAP (mmHg): 79 (23 1615) Noninvasive MAP (mmHg)  Av.1  Min: 50  Max: 108    Pulse: 73 (01/04/23 1621) Pulse  Min: 46  Max: 85   Resp: 20 (01/04/23 1621) Resp  Min: 15  Max: 20   SpO2: 90 % (01/04/23 1621) SpO2  Min: 90 %  Max: 98 %   Device: nasal cannula (01/04/23 1621)    Flow Rate: 2 (01/04/23 1621) Flow (L/min)  Min: 1  Max: 2         12/30/22 1847 12/30/22 1848   Weight: 59 kg (130 lb 1.1 oz) 59 kg (130 lb 1.1 oz)        Intake/Ouptut 24 hrs (7:00AM - 6:59 AM)  Intake & Output (last 3 days)       01/01 0701 01/02 0700 01/02 0701 01/03 0700 01/03 0701 01/04 0700 01/04 0701 01/05 0700    P.O. 480 480      I.V. (mL/kg) 1431 (24.3) 1110 (18.8) 208 (3.5)     Total Intake(mL/kg) 1911 (32.4) 1590 (26.9) 208 (3.5)     Urine (mL/kg/hr) 1600 (1.1) 2050 (1.4) 1250 (0.9) 300 (0.5)    Stool    0    Total Output 1600 2050 1250 300    Net +311 -460 -1042 -300            Urine Unmeasured Occurrence    1 x    Stool Unmeasured Occurrence    1 x        Hemodynamics:  SV SV (mL): 65 mL (01/04/23 1200) Normal:    SVI SVI (mL/min/m2): 40 mL/min/m2 (01/04/23 1200) Normal: 33-47   SVV SVV : 11 (01/04/23 1200)% Normal: < 10-15%   CO CO (L/min): 4.1 L/min (01/04/23 1200) Normal: 4-8 L/min   CI CI (L/min/m2): 2.5 L/min/m2 (01/04/23 1200) Normal: 2.5-4 L/min/m2         Medications (drips):  DOPamine, Last Rate: Stopped (01/03/23 1216)  phenylephrine, Last Rate: Stopped (01/04/23 1540)      Physical Examination  Telemetry:  Rhythm: normal sinus rhythm (01/04/23 1400)  Atrial Rhythm: atrial wandering pacemaker (01/02/23 1400)      Constitutional:  No acute distress.   Cardiovascular: RRR.    Respiratory: Normal breath sounds  No adventitious sounds.   Abdominal:  Soft with no tenderness.   Extremities: No Edema   Neurological:   Alert, Oriented, Cooperative.  Best Eye Response: 4-->(E4) spontaneous (01/04/23 1400)  Best Motor Response: 6-->(M6) obeys commands (01/04/23 1400)  Best Verbal Response: 5-->(V5) oriented (01/04/23 1400)  [place] Coma Scale Score: 15 (01/04/23 1400)     Results  Reviewed:  Laboratory  Microbiology  Radiology  Pathology    Hematology:  Results from last 7 days   Lab Units 01/04/23  0259 01/03/23  0336   WBC 10*3/mm3 14.19* 9.28   HEMOGLOBIN g/dL 10.6* 10.5*   MCV fL 98.8* 99.1*   PLATELETS 10*3/mm3 403 382   NEUTROS ABS 10*3/mm3 8.73* 6.10   LYMPHS ABS 10*3/mm3 3.74* 2.10   EOS ABS 10*3/mm3 0.25 0.16       Chemistry:  Estimated Creatinine Clearance: 47.2 mL/min (by C-G formula based on SCr of 1.11 mg/dL).    Results from last 7 days   Lab Units 01/04/23  0259 01/03/23  0336   SODIUM mmol/L 137 139   POTASSIUM mmol/L 4.7 4.6   CHLORIDE mmol/L 105 106   CO2 mmol/L 23.0 24.0   BUN mg/dL 31* 27*   CREATININE mg/dL 1.11 1.14   GLUCOSE mg/dL 101* 120*     Results from last 7 days   Lab Units 01/04/23 0259 01/03/23 0336 01/02/23  0347   CALCIUM mg/dL 8.4* 8.6 8.1*   MAGNESIUM mg/dL 1.7  --  2.8*   PHOSPHORUS mg/dL 2.6  --  3.0       Images:  XR Chest 1 View    Result Date: 1/4/2023  Impression: Stable mild bibasilar interstitial lung changes, whether mild atelectasis, pneumonia or scarring. No new or progressive chest pathology is seen. Electronically Signed: Garrett Castillo  1/4/2023 3:38 PM EST  Workstation ID: [id number]      Echo:  Results for orders placed during the hospital encounter of 12/30/22    Adult Transthoracic Echo Complete w/ Color, Spectral and Contrast if Necessary Per Protocol    Interpretation Summary  •  Left ventricular systolic function is moderately decreased. Left ventricular ejection fraction appears to be 31 - 35%.  •  The following left ventricular wall segments are hypokinetic: apical lateral, basal inferolateral, mid inferolateral, apical inferior, mid inferior and basal inferior.  •  The right ventricular cavity is mildly dilated.  •  Mild mitral valve regurgitation is present.  •  Mild tricuspid valve regurgitation is present.  •  Estimated right ventricular systolic pressure from tricuspid regurgitation is mildly elevated (35-45 mmHg).      Results:  Reviewed.  I reviewed the patient's new laboratory and imaging results.  I independently reviewed the patient's new images.    Medications: Reviewed.    Assessment   A/P     Hospital:  LOS: 5 days   ICU: 5d      Diagnosis   • **I21.19, STEMI S/P LOGAN RCA 22 [I21.19]   • PEA arrest ([facility]) [I46.9]   • Acute systolic heart failure ([facility]) [I50.21]   • Intraprocedural VF (ventricular fibrillation) ([facility]) [I49.01]   • Intraprocedural Complete heart block ([facility]) [I44.2]   • Tobacco use [Z72.0]   • COPD (chronic obstructive pulmonary disease) ([facility]) [J44.9]   • GERD (gastroesophageal reflux disease) [K21.9]   • Chronic hypoxemic respiratory failure ([facility]) [J96.11]       • Prediabetes [R73.03]   • RICKIE (acute kidney injury) on probable CKD [N17.9]     Tucker is a 77 y.o. male admitted on 2022 with STEMI (ST elevation myocardial infarction) ([facility]) [I21.3]    Assessment/Management/Treatment Plan:    1. STEMI - inferior wall.  a. S/P LOGAN RCA 2022. Intraprocedure V Fib and complete heart block.  b. Hypotension - Dopamine continuous intravenous infusion   c. HFrEF  2. Pulmonary   a. COPD no exacerbation  b. Home Oxygen (2L/m)  c. Tobacco use but quit 1 month prior to admission  3. GI/Hepatology  a. GERD  4. Renal  a. RICKIE    5. Endocrine  a. Body mass index is 21.64 kg/m². Normal: 18.5-24.9kg/m2  b. At risk for DM (pre-diabetes) based on his HbA1c. [HbA1C 5.7%-6.4%, eA-139 mg/dL].     Lab Results   Lab Value Date/Time    HGBA1C 6.10 (H) 2022 1609     Results from last 7 days   Lab Units 23  1611 23  1117 23  0724 23  2005 23  1559 23  1128 23  0710 23   GLUCOSE mg/dL 103 96 97 118 113 117 106 124       Diet: Diet: Cardiac Diets, Diabetic Diets; Healthy Heart (2-3 Na+); Consistent Carbohydrate; Texture: Regular Texture (IDDSI 7); Fluid Consistency: Thin (IDDSI 0)  Orders Placed This Encounter      DIET MESSAGE Pt would like a cheese burger and BBQ chips for  dinner please :)      Advance Directives: Code Status and Medical Interventions:   Ordered at: 12/30/22 1534     Level Of Support Discussed With:    Patient     Code Status (Patient has no pulse and is not breathing):    CPR (Attempt to Resuscitate)     Medical Interventions (Patient has pulse or is breathing):    Full        DVT prophylaxis:  No DVT prophylaxis order currently exists.     In brief:  1. ClearSight for non-invasive monitoring.  1. Fluid challenge  2. Wean pressors.  3. Midodrine started by Cardiology  4. Disposition: Keep in ICU.    Plan of care and goals reviewed during interdisciplinary rounds.  I discussed the patient's findings and my recommendations with patient and nursing staff    Time: I spent 35 minutes.    Time includes time spent before, during and after visit on the day of the encounter, evaluating patient, reviewing records and ordering labs or other diagnostic studies, communicating with other health care professionals, documenting the encounter and coordinating care.    This is non-concurrent time.           [] Primary Attending Intensive Care Medicine - Nutrition Support   [x] Consultant

## 2023-01-04 NOTE — PLAN OF CARE
Goal Outcome Evaluation:           Progress: improving     Patient neuro intact. Patient on RA sats >93%. HR 52-75. SBP . On martin gtt, titrating for a MAP >60. Replacing mag this AM. Will continue to monitor.

## 2023-01-04 NOTE — THERAPY TREATMENT NOTE
Patient Name: Tucker Ambrosio  : 1945    MRN: [id number]                              Today's Date: 2023       Admit Date: 2022    Visit Dx: No diagnosis found.  Patient Active Problem List   Diagnosis   • PEA arrest (HCC)   • I21.19, STEMI S/P LOGAN RCA 22   • Acute systolic heart failure (HCC)   • Intraprocedural VF (ventricular fibrillation) (HCC)   • Intraprocedural Complete heart block (HCC)   • STEMI (ST elevation myocardial infarction) (HCC)   • Tobacco use   • COPD (chronic obstructive pulmonary disease) (HCC)   • GERD (gastroesophageal reflux disease)   • Chronic hypoxemic respiratory failure (HCC)   • Prediabetes   • RICKIE (acute kidney injury) on probable CKD     Past Medical History:   Diagnosis Date   • Chronic hypoxemic respiratory failure (HCC)    • COPD (chronic obstructive pulmonary disease) (HCC)    • GERD (gastroesophageal reflux disease)      Past Surgical History:   Procedure Laterality Date   • APPENDECTOMY     • SHOULDER SURGERY     • SKIN CANCER EXCISION        General Information     Row Name 23 0939          Physical Therapy Time and Intention    Document Type therapy note (daily note)  -     Mode of Treatment physical therapy  -     Row Name 23 0939          General Information    Patient Profile Reviewed yes  -     Existing Precautions/Restrictions fall;oxygen therapy device and L/min;cardiac  -     Barriers to Rehab medically complex  -     Row Name 2339          Cognition    Orientation Status (Cognition) oriented x 4  -     Row Name 23 0939          Safety Issues, Functional Mobility    Safety Issues Affecting Function (Mobility) safety precautions follow-through/compliance;insight into deficits/self-awareness;safety precaution awareness;awareness of need for assistance  -     Impairments Affecting Function (Mobility) balance;endurance/activity tolerance;strength  -HM           User Key  (r) = Recorded By, (t) = Taken By, (c) =  Cosigned By    Initials Name Provider Type     Jacklyn Rangel PT Physical Therapist               Mobility     Row Name 01/04/23 0939          Sit-Stand Transfer    Sit-Stand Cuming (Transfers) contact guard  -HM     Assistive Device (Sit-Stand Transfers) walker, front-wheeled  -HM     Comment, (Sit-Stand Transfer) required cueing for hand placement for safety  -     Row Name 01/04/23 0939          Gait/Stairs (Locomotion)    Cuming Level (Gait) contact guard  -HM     Assistive Device (Gait) walker, front-wheeled  -HM     Distance in Feet (Gait) 400  -HM     Deviations/Abnormal Patterns (Gait) bilateral deviations;mynor decreased;stride length decreased  -HM     Bilateral Gait Deviations heel strike decreased;forward flexed posture  -     Cuming Level (Stairs) contact guard  -HM     Handrail Location (Stairs) left side (ascending);left side (descending)  -     Number of Steps (Stairs) 4  -HM     Ascending Technique (Stairs) step-to-step  -HM     Descending Technique (Stairs) step-to-step  -HM     Comment, (Gait/Stairs) pt continued to require cueing for upright posture and ambulating within close proximity to walker to assist with upright posture and safety. Pt improving with steadiness at this date. Pt demonstrated no LOB with stair training  -           User Key  (r) = Recorded By, (t) = Taken By, (c) = Cosigned By    Initials Name Provider Type     Jacklyn Rangel PT Physical Therapist               Obj/Interventions     Row Name 01/04/23 0941          Motor Skills    Therapeutic Exercise other (see comments)  BLE APs, heel slides, SLR, LAQ, seated marches, hip ab/ad x 10 reps  -     Row Name 01/04/23 0941          Balance    Balance Assessment sitting static balance;sitting dynamic balance;sit to stand dynamic balance;standing static balance;standing dynamic balance  -     Static Sitting Balance standby assist  -     Dynamic Sitting Balance standby assist  -      Position, Sitting Balance sitting in chair  -     Sit to Stand Dynamic Balance contact guard;verbal cues;1 person to manage equipment;1-person assist  -     Static Standing Balance contact guard  -     Dynamic Standing Balance contact guard;verbal cues;1 person to manage equipment;1-person assist  -     Position/Device Used, Standing Balance supported;walker, front-wheeled  -           User Key  (r) = Recorded By, (t) = Taken By, (c) = Cosigned By    Initials Name Provider Type     Jacklyn Rangel, PT Physical Therapist               Goals/Plan    No documentation.                Clinical Impression     Row Name 01/04/23 0944          Pain    Pretreatment Pain Rating 0/10 - no pain  -     Posttreatment Pain Rating 0/10 - no pain  -     Row Name 01/04/23 0944          Plan of Care Review    Plan of Care Reviewed With patient  -     Progress improving  -     Outcome Evaluation Pt ambulated 400 feet CGA with FWx with cueing for upright posture and sequencing with walker for safety and ascended/descended 4 stairs CGA with one handrail with education on a step to step pattern for energy conservation and safety. Pt continued to require intermittent cueing for sequencing and safety with mobility. Will continue to progress IPPT POC as tolerated.  -     Row Name 01/04/23 0944          Therapy Assessment/Plan (PT)    Rehab Potential (PT) good, to achieve stated therapy goals  -     Criteria for Skilled Interventions Met (PT) yes;meets criteria;skilled treatment is necessary  -     Therapy Frequency (PT) daily  -     Row Name 01/04/23 0944          Vital Signs    Pre Systolic BP Rehab 94  RN cleared PT session  -     Pre Treatment Diastolic BP 73  -HM     Post Systolic BP Rehab 95  -HM     Post Treatment Diastolic BP 68  -HM     Pretreatment Heart Rate (beats/min) 70  -HM     Posttreatment Heart Rate (beats/min) 78  -HM     Pre SpO2 (%) 94  -HM     O2 Delivery Pre Treatment room air  -     O2  Delivery Intra Treatment room air  -HM     Post SpO2 (%) 93  -HM     O2 Delivery Post Treatment room air  -HM     Pre Patient Position Sitting  -HM     Intra Patient Position Standing  -HM     Post Patient Position Sitting  -HM     Row Name 01/04/23 0944          Positioning and Restraints    Pre-Treatment Position sitting in chair/recliner  -HM     Post Treatment Position chair  -HM     In Chair with nsg;with other staff;notified nsg;reclined;sitting;call light within reach;encouraged to call for assist;RUE elevated;LUE elevated;waffle cushion;legs elevated  -           User Key  (r) = Recorded By, (t) = Taken By, (c) = Cosigned By    Initials Name Provider Type     Jacklyn Rangel PT Physical Therapist               Outcome Measures     Row Name 01/04/23 0951          How much help from another person do you currently need...    Turning from your back to your side while in flat bed without using bedrails? 4  -HM     Moving from lying on back to sitting on the side of a flat bed without bedrails? 4  -HM     Moving to and from a bed to a chair (including a wheelchair)? 3  -HM     Standing up from a chair using your arms (e.g., wheelchair, bedside chair)? 3  -HM     Climbing 3-5 steps with a railing? 3  -HM     To walk in hospital room? 3  -HM     AM-PAC 6 Clicks Score (PT) 20  -HM     Highest level of mobility 6 --> Walked 10 steps or more  -     Row Name 01/04/23 0951          Functional Assessment    Outcome Measure Options AM-PAC 6 Clicks Basic Mobility (PT)  -           User Key  (r) = Recorded By, (t) = Taken By, (c) = Cosigned By    Initials Name Provider Type     Jacklyn Rangel PT Physical Therapist                             Physical Therapy Education     Title: PT OT SLP Therapies (Done)     Topic: Physical Therapy (Done)     Point: Mobility training (Done)     Learning Progress Summary           Patient Acceptance, E,TB, VU,NR by  at 1/4/2023 0951    Acceptance, E,TB, VU,NR by  at  1/3/2023 1610                   Point: Home exercise program (Done)     Learning Progress Summary           Patient Acceptance, E,TB, VU,NR by  at 1/4/2023 0951                   Point: Body mechanics (Done)     Learning Progress Summary           Patient Acceptance, E,TB, VU,NR by  at 1/4/2023 0951    Acceptance, E,TB, VU,NR by  at 1/3/2023 1610                   Point: Precautions (Done)     Learning Progress Summary           Patient Acceptance, E,TB, VU,NR by  at 1/4/2023 0951    Acceptance, E,TB, VU,NR by  at 1/3/2023 1610                               User Key     Initials Effective Dates Name Provider Type [facility] 09/22/22 -  Jacklyn Rangel PT Physical Therapist PT              PT Recommendation and Plan  Planned Therapy Interventions (PT): balance training, bed mobility training, gait training, home exercise program, postural re-education, patient/family education, neuromuscular re-education, transfer training, strengthening, stair training  Plan of Care Reviewed With: patient  Progress: improving  Outcome Evaluation: Pt ambulated 400 feet CGA with FWx with cueing for upright posture and sequencing with walker for safety and ascended/descended 4 stairs CGA with one handrail with education on a step to step pattern for energy conservation and safety. Pt continued to require intermittent cueing for sequencing and safety with mobility. Will continue to progress IPPT POC as tolerated.     Time Calculation:    PT Charges     Row Name 01/04/23 0952             Time Calculation    Start Time 0835  -HM      PT Received On 01/04/23  -         Timed Charges    44267 - PT Therapeutic Exercise Minutes 10  -HM      02345 - PT Therapeutic Activity Minutes 15  -HM         Total Minutes    Timed Charges Total Minutes 25  -HM       Total Minutes 25  -HM            User Key  (r) = Recorded By, (t) = Taken By, (c) = Cosigned By    Initials Name Provider Type     Jacklyn Rangel PT Physical Therapist               Therapy Charges for Today     Code Description Service Date Service Provider Modifiers Qty    69296193795 HC PT EVAL MOD COMPLEXITY 4 1/3/2023 Jacklyn Ragnel, PT GP 1    29196634485 HC PT THER SUPP EA 15 MIN 1/3/2023 Jacklyn Rangel, PT GP 2    32201625477 HC PT THER PROC EA 15 MIN 1/4/2023 Jacklyn Rangel, PT GP 1    78499497029 HC PT THERAPEUTIC ACT EA 15 MIN 1/4/2023 Jacklyn Rangel, PT GP 1    18720321405 HC PT THER SUPP EA 15 MIN 1/4/2023 Jacklyn Rangel, PT GP 2          PT G-Codes  Outcome Measure Options: AM-PAC 6 Clicks Basic Mobility (PT)  AM-PAC 6 Clicks Score (PT): 20  PT Discharge Summary  Anticipated Discharge Disposition (PT): home with assist    Jacklyn Rangel, PT  1/4/2023

## 2023-01-04 NOTE — PROGRESS NOTES
"[facility] Progress Note     LOS: 5 days   Patient Care Team:  Avery Sheldon MD as PCP - General (Family Medicine)  PCP:  Avery Sheldon MD    Chief Complaint: Follow-up after inferior STEMI, persistent hypotension.    Subjective: Patient feels well currently with no complaints of chest pain or dyspnea.  He remains on IV Yonathan-Synephrine and p.o. midodrine to help support his blood pressure.  He is breathing comfortably on room air.      Review of Systems:   All systems have been reviewed and are negative with the exception of those mentioned above.      Objective:    Vital Sign Min/Max for last 24 hours  Temp  Min: 96.7 °F (35.9 °C)  Max: 98.7 °F (37.1 °C)   BP  Min: 67/45  Max: 119/76   Pulse  Min: 52  Max: 89   Resp  Min: 15  Max: 18   SpO2  Min: 89 %  Max: 97 %   No data recorded   No data recorded     Flowsheet Rows    Flowsheet Row First Filed Value   Admission Height 165.1 cm (65\") Documented at 12/30/2022 1405   Admission Weight 59 kg (130 lb) Documented at 12/30/2022 1405          Telemetry: Sinus rhythm    EKG: Sinus with evolving inferior infarct.      Intake/Output Summary (Last 24 hours) at 1/4/2023 1044  Last data filed at 1/4/2023 0200  Gross per 24 hour   Intake 208 ml   Output 925 ml   Net -717 ml     Intake & Output (last 3 days)       01/01 0701  01/02 0700 01/02 0701  01/03 0700 01/03 0701  01/04 0700 01/04 0701  01/05 0700    P.O. 480 480      I.V. (mL/kg) 1431 (24.3) 1110 (18.8) 208 (3.5)     Total Intake(mL/kg) 1911 (32.4) 1590 (26.9) 208 (3.5)     Urine (mL/kg/hr) 1600 (1.1) 2050 (1.4) 1250 (0.9)     Stool        Total Output 1600 2050 1250     Net +311 -460 -1042                    Physical Exam:  Constitutional:       Appearance: Not in distress.   Neck:      Vascular: JVD normal.   Pulmonary:      Effort: Pulmonary effort is normal.      Breath sounds: No rales.   Cardiovascular:      Normal rate.      Murmurs: There is no " murmur.   Pulses:     Intact distal pulses.   Edema:     Peripheral edema absent.   Skin:     General: Skin is warm and dry.   Neurological:      General: No focal deficit present.          LABS/DIAGNOSTIC DATA:  Results from last 7 days   Lab Units 01/04/23 0259 01/03/23 0336 01/02/23 0347   WBC 10*3/mm3 14.19* 9.28 9.85   HEMOGLOBIN g/dL 10.6* 10.5* 12.1*   HEMATOCRIT % 32.9* 33.0* 37.2*   PLATELETS 10*3/mm3 403 382 425     Lab Results   Lab Value Date/Time    TROPONINT 8.170 (C) 12/31/2022 0302    TROPONINT 2.100 (C) 12/30/2022 1608         Results from last 7 days   Lab Units 01/04/23 0259 01/03/23 0336 01/02/23 0347 12/31/22  0302 12/30/22  1608   SODIUM mmol/L 137 139 138   < > 137   POTASSIUM mmol/L 4.7 4.6 4.6   < > 4.2   CHLORIDE mmol/L 105 106 106   < > 103   CO2 mmol/L 23.0 24.0 24.0   < > 23.0   BUN mg/dL 31* 27* 25*   < > 15   CREATININE mg/dL 1.11 1.14 1.31*   < > 1.40*   CALCIUM mg/dL 8.4* 8.6 8.1*   < > 8.5*   BILIRUBIN mg/dL  --   --   --   --  0.5   ALK PHOS U/L  --   --   --   --  200*   ALT (SGPT) U/L  --   --   --   --  51*   AST (SGOT) U/L  --   --   --   --  167*   GLUCOSE mg/dL 101* 120* 137*   < > 223*    < > = values in this interval not displayed.     Results from last 7 days   Lab Units 12/30/22  1609   HEMOGLOBIN A1C % 6.10*     Results from last 7 days   Lab Units 12/31/22  0302   CHOLESTEROL mg/dL 134   TRIGLYCERIDES mg/dL 68   HDL CHOL mg/dL 36*   LDL CHOL mg/dL 84     Results from last 7 days   Lab Units 12/30/22  1608   TSH uIU/mL 1.400           Medication Review:   aspirin, 81 mg, Oral, Daily  digoxin, 250 mcg, Intravenous, Once  digoxin, 500 mcg, Intravenous, Once  [START ON 1/5/2023] digoxin, 250 mcg, Oral, Daily  insulin lispro, 0-9 Units, Subcutaneous, 4x Daily With Meals & Nightly  ipratropium-albuterol, 3 mL, Nebulization, 4x Daily - RT  midodrine, 10 mg, Oral, TID AC  nicotine, 1 patch, Transdermal, Q24H  pantoprazole, 40 mg, Oral, Q AM  pharmacy consult - MTM, , Does  not apply, Daily  prasugrel, 5 mg, Oral, Daily  rosuvastatin, 20 mg, Oral, Nightly       DOPamine, 2-20 mcg/kg/min, Last Rate: Stopped (01/03/23 1216)  phenylephrine, 0.5-3 mcg/kg/min, Last Rate: 0.4 mcg/kg/min (01/04/23 0051)           I21.19, STEMI S/P LOGAN RCA 12/30/22    PEA arrest (HCC)    Acute systolic heart failure (HCC)    Intraprocedural VF (ventricular fibrillation) (HCC)    Intraprocedural Complete heart block (HCC)    Tobacco use    COPD (chronic obstructive pulmonary disease) (HCC)    GERD (gastroesophageal reflux disease)    Chronic hypoxemic respiratory failure (HCC)    Prediabetes    RICKIE (acute kidney injury) on probable CKD      Assessment/Plan:  1. Inferior STEMI, CAD  a. Status post LOGAN x1 to 100% RCA  2. Complete heart block  a. Status post temporary pacemaker wire during heart cath.  Removed prior to transfer to ICU  3. Intraprocedural ventricular fibrillation  a. Status post defibrillation x1 intra procedurally, conversion to sinus rhythm  b. On amiodarone briefly.  No discontinued.  4. Acute systolic heart failure, RV dilatation, possible RV dysfunction  a. EF 45% with inferior hypokinesis  5. Recent influenza  6. Tobacco use, discontinued 1 month ago     Plan:  1. Continue midodrine 10 mg 3 times daily for hypertension  2. Continue Yonathan-Synephrine for pressor support, wean as tolerated  3. Will add digoxin to try and help support RV function.  IV line was 500 MCG now, 250 MCG 5 PM, 250 MCG starting p.o. tomorrow a.m.  4. Agree with IV albumin plan by ICU service  5. Continue aspirin and Effient therapy status post stent to the RCA. (Maintenance dose 5 mg daily as age greater than 75)   6. High intensity statin  7. Eventual beta-blocker when blood pressure allows off of vasopressor  8. ACEI/ARB/ARNi/MRA/SGLT2i when blood pressure allows   9. PT consult  10. Transfer to telemetry when off of vasopressors  11. Will order repeat echocardiogram for tomorrow a.m.  12. Check chest x-ray today with  new leukocytosis             Roderick Aragon MD [facility]  01/04/23

## 2023-01-04 NOTE — PLAN OF CARE
Goal Outcome Evaluation:  Plan of Care Reviewed With: patient        Progress: improving  Outcome Evaluation: Pt ambulated 400 feet CGA with FWx with cueing for upright posture and sequencing with walker for safety and ascended/descended 4 stairs CGA with one handrail with education on a step to step pattern for energy conservation and safety. Pt continued to require intermittent cueing for sequencing and safety with mobility. Will continue to progress IPPT POC as tolerated.

## 2023-01-04 NOTE — CASE MANAGEMENT/SOCIAL WORK
Continued Stay Note   John     Patient Name: Tucker Ambrosio  MRN: [id number]  Today's Date: 1/4/2023    Admit Date: 12/30/2022    Plan: home with home health   Discharge Plan     Row Name 01/04/23 1059       Plan    Plan home with home health    Patient/Family in Agreement with Plan yes    Plan Comments I spoke with Mr. Ambrosio in room and he did state he had home health prior to hospitalization but does not know name.  I spoke with his daughter and she confirmed it was [facility].  [facility] with [facility] and he is current for skilled nursing, PT and OT.  They will need order to resume home health at discharge and it can be faxed to [phone].  CM will continue to follow for discharge planning    Final Discharge Disposition Code 06 - home with home health care               Discharge Codes    No documentation.               Expected Discharge Date and Time     Expected Discharge Date Expected Discharge Time    Jan 6, 2023             Lilia Sheth RN

## 2023-01-05 ENCOUNTER — APPOINTMENT (OUTPATIENT)
Dept: CARDIOLOGY | Facility: HOSPITAL | Age: 78
DRG: 246 | End: 2023-01-05
Payer: MEDICARE

## 2023-01-05 ENCOUNTER — APPOINTMENT (OUTPATIENT)
Dept: GENERAL RADIOLOGY | Facility: HOSPITAL | Age: 78
DRG: 246 | End: 2023-01-05
Payer: MEDICARE

## 2023-01-05 LAB
ANION GAP SERPL CALCULATED.3IONS-SCNC: 9 MMOL/L (ref 5–15)
ASCENDING AORTA: 2.9 CM
BASOPHILS # BLD AUTO: 0.08 10*3/MM3 (ref 0–0.2)
BASOPHILS NFR BLD AUTO: 0.8 % (ref 0–1.5)
BH CV ECHO MEAS - AO MAX PG: 5.1 MMHG
BH CV ECHO MEAS - AO MEAN PG: 3.2 MMHG
BH CV ECHO MEAS - AO ROOT DIAM: 3.3 CM
BH CV ECHO MEAS - AO V2 MAX: 112.4 CM/SEC
BH CV ECHO MEAS - AO V2 VTI: 18.5 CM
BH CV ECHO MEAS - AVA(I,D): 3 CM2
BH CV ECHO MEAS - EDV(CUBED): 103.9 ML
BH CV ECHO MEAS - EDV(MOD-SP2): 72.5 ML
BH CV ECHO MEAS - EDV(MOD-SP4): 71.1 ML
BH CV ECHO MEAS - EF(MOD-BP): 49 %
BH CV ECHO MEAS - EF(MOD-SP2): 43.5 %
BH CV ECHO MEAS - EF(MOD-SP4): 48.7 %
BH CV ECHO MEAS - ESV(CUBED): 62.2 ML
BH CV ECHO MEAS - ESV(MOD-SP2): 41 ML
BH CV ECHO MEAS - ESV(MOD-SP4): 36.5 ML
BH CV ECHO MEAS - FS: 15.7 %
BH CV ECHO MEAS - IVS/LVPW: 0.95 CM
BH CV ECHO MEAS - IVSD: 0.8 CM
BH CV ECHO MEAS - LA DIMENSION: 1.9 CM
BH CV ECHO MEAS - LAT PEAK E' VEL: 7 CM/SEC
BH CV ECHO MEAS - LV DIASTOLIC VOL/BSA (35-75): 42.1 CM2
BH CV ECHO MEAS - LV MASS(C)D: 127.1 GRAMS
BH CV ECHO MEAS - LV MAX PG: 4.2 MMHG
BH CV ECHO MEAS - LV MEAN PG: 2.17 MMHG
BH CV ECHO MEAS - LV SYSTOLIC VOL/BSA (12-30): 21.6 CM2
BH CV ECHO MEAS - LV V1 MAX: 101.7 CM/SEC
BH CV ECHO MEAS - LV V1 VTI: 17.5 CM
BH CV ECHO MEAS - LVIDD: 4.7 CM
BH CV ECHO MEAS - LVIDS: 4 CM
BH CV ECHO MEAS - LVOT AREA: 3.2 CM2
BH CV ECHO MEAS - LVOT DIAM: 2.03 CM
BH CV ECHO MEAS - LVPWD: 0.85 CM
BH CV ECHO MEAS - MED PEAK E' VEL: 5 CM/SEC
BH CV ECHO MEAS - MV A MAX VEL: 95.2 CM/SEC
BH CV ECHO MEAS - MV DEC SLOPE: 256.8 CM/SEC2
BH CV ECHO MEAS - MV DEC TIME: 0.23 MSEC
BH CV ECHO MEAS - MV E MAX VEL: 60.3 CM/SEC
BH CV ECHO MEAS - MV E/A: 0.63
BH CV ECHO MEAS - MV MAX PG: 3.2 MMHG
BH CV ECHO MEAS - MV MEAN PG: 1.6 MMHG
BH CV ECHO MEAS - MV P1/2T: 105 MSEC
BH CV ECHO MEAS - MV V2 VTI: 20.1 CM
BH CV ECHO MEAS - MVA(VTI): 2.8 CM2
BH CV ECHO MEAS - PA ACC TIME: 0.18 SEC
BH CV ECHO MEAS - PA PR(ACCEL): -1.92 MMHG
BH CV ECHO MEAS - PA V2 MAX: 93.9 CM/SEC
BH CV ECHO MEAS - RAP SYSTOLE: 3 MMHG
BH CV ECHO MEAS - RVSP: 31.6 MMHG
BH CV ECHO MEAS - SI(MOD-SP2): 18.7 ML/M2
BH CV ECHO MEAS - SI(MOD-SP4): 20.5 ML/M2
BH CV ECHO MEAS - SV(LVOT): 56.4 ML
BH CV ECHO MEAS - SV(MOD-SP2): 31.5 ML
BH CV ECHO MEAS - SV(MOD-SP4): 34.6 ML
BH CV ECHO MEAS - TAPSE (>1.6): 2.1 CM
BH CV ECHO MEAS - TR MAX PG: 28.6 MMHG
BH CV ECHO MEAS - TR MAX VEL: 267.3 CM/SEC
BH CV ECHO MEASUREMENTS AVERAGE E/E' RATIO: 10.05
BH CV XLRA - RV BASE: 3.4 CM
BH CV XLRA - RV LENGTH: 7.1 CM
BH CV XLRA - RV MID: 3.2 CM
BH CV XLRA - TDI S': 14 CM/SEC
BUN SERPL-MCNC: 38 MG/DL (ref 8–23)
BUN/CREAT SERPL: 27.3 (ref 7–25)
CALCIUM SPEC-SCNC: 8.2 MG/DL (ref 8.6–10.5)
CHLORIDE SERPL-SCNC: 105 MMOL/L (ref 98–107)
CO2 SERPL-SCNC: 22 MMOL/L (ref 22–29)
CREAT SERPL-MCNC: 1.39 MG/DL (ref 0.76–1.27)
D-LACTATE SERPL-SCNC: 1.1 MMOL/L (ref 0.5–2)
DEPRECATED RDW RBC AUTO: 53.6 FL (ref 37–54)
EGFRCR SERPLBLD CKD-EPI 2021: 52.5 ML/MIN/1.73
EOSINOPHIL # BLD AUTO: 0.32 10*3/MM3 (ref 0–0.4)
EOSINOPHIL NFR BLD AUTO: 3 % (ref 0.3–6.2)
ERYTHROCYTE [DISTWIDTH] IN BLOOD BY AUTOMATED COUNT: 14.7 % (ref 12.3–15.4)
GLUCOSE BLDC GLUCOMTR-MCNC: 96 MG/DL (ref 70–130)
GLUCOSE SERPL-MCNC: 104 MG/DL (ref 65–99)
HCT VFR BLD AUTO: 32.5 % (ref 37.5–51)
HGB BLD-MCNC: 10.4 G/DL (ref 13–17.7)
IMM GRANULOCYTES # BLD AUTO: 0.14 10*3/MM3 (ref 0–0.05)
IMM GRANULOCYTES NFR BLD AUTO: 1.3 % (ref 0–0.5)
IVRT: 99 MSEC
LEFT ATRIUM VOLUME INDEX: 13.1 ML/M2
LYMPHOCYTES # BLD AUTO: 3.21 10*3/MM3 (ref 0.7–3.1)
LYMPHOCYTES NFR BLD AUTO: 30.2 % (ref 19.6–45.3)
MAGNESIUM SERPL-MCNC: 2.5 MG/DL (ref 1.6–2.4)
MAXIMAL PREDICTED HEART RATE: 144 BPM
MCH RBC QN AUTO: 31.6 PG (ref 26.6–33)
MCHC RBC AUTO-ENTMCNC: 32 G/DL (ref 31.5–35.7)
MCV RBC AUTO: 98.8 FL (ref 79–97)
MONOCYTES # BLD AUTO: 1.12 10*3/MM3 (ref 0.1–0.9)
MONOCYTES NFR BLD AUTO: 10.5 % (ref 5–12)
NEUTROPHILS NFR BLD AUTO: 5.77 10*3/MM3 (ref 1.7–7)
NEUTROPHILS NFR BLD AUTO: 54.2 % (ref 42.7–76)
NRBC BLD AUTO-RTO: 0 /100 WBC (ref 0–0.2)
PHOSPHATE SERPL-MCNC: 3.3 MG/DL (ref 2.5–4.5)
PLATELET # BLD AUTO: 406 10*3/MM3 (ref 140–450)
PMV BLD AUTO: 10 FL (ref 6–12)
POTASSIUM SERPL-SCNC: 4.9 MMOL/L (ref 3.5–5.2)
PROCALCITONIN SERPL-MCNC: 0.23 NG/ML (ref 0–0.25)
RBC # BLD AUTO: 3.29 10*6/MM3 (ref 4.14–5.8)
SODIUM SERPL-SCNC: 136 MMOL/L (ref 136–145)
STRESS TARGET HR: 122 BPM
WBC NRBC COR # BLD: 10.64 10*3/MM3 (ref 3.4–10.8)

## 2023-01-05 PROCEDURE — 94799 UNLISTED PULMONARY SVC/PX: CPT

## 2023-01-05 PROCEDURE — 99232 SBSQ HOSP IP/OBS MODERATE 35: CPT | Performed by: INTERNAL MEDICINE

## 2023-01-05 PROCEDURE — 93306 TTE W/DOPPLER COMPLETE: CPT

## 2023-01-05 PROCEDURE — 84100 ASSAY OF PHOSPHORUS: CPT | Performed by: INTERNAL MEDICINE

## 2023-01-05 PROCEDURE — 71045 X-RAY EXAM CHEST 1 VIEW: CPT

## 2023-01-05 PROCEDURE — 84145 PROCALCITONIN (PCT): CPT | Performed by: INTERNAL MEDICINE

## 2023-01-05 PROCEDURE — 97116 GAIT TRAINING THERAPY: CPT

## 2023-01-05 PROCEDURE — 83735 ASSAY OF MAGNESIUM: CPT | Performed by: INTERNAL MEDICINE

## 2023-01-05 PROCEDURE — 80048 BASIC METABOLIC PNL TOTAL CA: CPT | Performed by: INTERNAL MEDICINE

## 2023-01-05 PROCEDURE — 83605 ASSAY OF LACTIC ACID: CPT | Performed by: INTERNAL MEDICINE

## 2023-01-05 PROCEDURE — 93306 TTE W/DOPPLER COMPLETE: CPT | Performed by: INTERNAL MEDICINE

## 2023-01-05 PROCEDURE — 85025 COMPLETE CBC W/AUTO DIFF WBC: CPT | Performed by: INTERNAL MEDICINE

## 2023-01-05 PROCEDURE — 82962 GLUCOSE BLOOD TEST: CPT

## 2023-01-05 RX ORDER — DIGOXIN 125 MCG
62.5 TABLET ORAL
Status: DISCONTINUED | OUTPATIENT
Start: 2023-01-05 | End: 2023-01-06 | Stop reason: HOSPADM

## 2023-01-05 RX ADMIN — Medication 1 PATCH: at 08:59

## 2023-01-05 RX ADMIN — IPRATROPIUM BROMIDE AND ALBUTEROL SULFATE 3 ML: 2.5; .5 SOLUTION RESPIRATORY (INHALATION) at 16:07

## 2023-01-05 RX ADMIN — Medication 5 MG: at 21:09

## 2023-01-05 RX ADMIN — DIGOXIN 62.5 MCG: 125 TABLET ORAL at 14:02

## 2023-01-05 RX ADMIN — IPRATROPIUM BROMIDE AND ALBUTEROL SULFATE 3 ML: 2.5; .5 SOLUTION RESPIRATORY (INHALATION) at 20:36

## 2023-01-05 RX ADMIN — MIDODRINE HYDROCHLORIDE 10 MG: 10 TABLET ORAL at 08:58

## 2023-01-05 RX ADMIN — PRASUGREL 5 MG: 10 TABLET, FILM COATED ORAL at 08:59

## 2023-01-05 RX ADMIN — ROSUVASTATIN 20 MG: 20 TABLET, FILM COATED ORAL at 21:08

## 2023-01-05 RX ADMIN — MIDODRINE HYDROCHLORIDE 15 MG: 10 TABLET ORAL at 14:01

## 2023-01-05 RX ADMIN — PANTOPRAZOLE SODIUM 40 MG: 40 TABLET, DELAYED RELEASE ORAL at 05:54

## 2023-01-05 RX ADMIN — ASPIRIN 81 MG: 81 TABLET, COATED ORAL at 08:58

## 2023-01-05 RX ADMIN — MIDODRINE HYDROCHLORIDE 15 MG: 10 TABLET ORAL at 17:04

## 2023-01-05 RX ADMIN — IPRATROPIUM BROMIDE AND ALBUTEROL SULFATE 3 ML: 2.5; .5 SOLUTION RESPIRATORY (INHALATION) at 12:14

## 2023-01-05 NOTE — PLAN OF CARE
Problem: Adult Inpatient Plan of Care  Goal: Plan of Care Review  Outcome: Ongoing, Progressing  Flowsheets (Taken 1/5/2023 3128)  Progress: improving  Plan of Care Reviewed With: patient  Outcome Evaluation: Pt VSS, RACHEL, neuro intact. Yonathan off at 0900, midodrine increased to 15mg TID, ECHO completed at bedside. UOP adequate per shift, good appetite. Transferred to [facility], plan for home tomorrow.

## 2023-01-05 NOTE — CASE MANAGEMENT/SOCIAL WORK
Continued Stay Note  [facility]     Patient Name: Tucker Ambrosio  MRN: [id number]  Today's Date: 1/5/2023    Admit Date: 12/30/2022    Plan: home with home health   Discharge Plan     Row Name 01/05/23 1106       Plan    Plan home with home health    Patient/Family in Agreement with Plan yes    Plan Comments Mr. Ambrosio ambulated 400ft with therapy yesterday.  He is on room air.  Plan is home with [facility].  CM following.    Final Discharge Disposition Code 06 - home with home health care               Discharge Codes    No documentation.               Expected Discharge Date and Time     Expected Discharge Date Expected Discharge Time    Jan 7, 2023             Lilia Sheth RN

## 2023-01-05 NOTE — THERAPY TREATMENT NOTE
Patient Name: Tucker Ambrosio  : 1945    MRN: [id number]                              Today's Date: 2023       Admit Date: 2022    Visit Dx: No diagnosis found.  Patient Active Problem List   Diagnosis   • PEA arrest (HCC)   • I21.19, STEMI S/P LOGAN RCA 22   • Acute systolic heart failure (HCC)   • Intraprocedural VF (ventricular fibrillation) (HCC)   • Intraprocedural Complete heart block (HCC)   • STEMI (ST elevation myocardial infarction) (HCC)   • Tobacco use   • COPD (chronic obstructive pulmonary disease) (HCC)   • GERD (gastroesophageal reflux disease)   • Chronic hypoxemic respiratory failure (HCC)   • Prediabetes   • RICKIE (acute kidney injury) on probable CKD     Past Medical History:   Diagnosis Date   • Chronic hypoxemic respiratory failure (HCC)    • COPD (chronic obstructive pulmonary disease) (HCC)    • GERD (gastroesophageal reflux disease)      Past Surgical History:   Procedure Laterality Date   • APPENDECTOMY     • CARDIAC CATHETERIZATION N/A 2022    Procedure: Left Heart Cath;  Surgeon: Kieran Reddy MD;  Location: [facility] CATH INVASIVE LOCATION;  Service: Cardiology;  Laterality: N/A;   • SHOULDER SURGERY     • SKIN CANCER EXCISION        General Information     Row Name 23 1127          Physical Therapy Time and Intention    Document Type therapy note (daily note)  -KG     Mode of Treatment physical therapy  -KG     Row Name 23 1127          General Information    Existing Precautions/Restrictions no known precautions/restrictions  -KG     Row Name 23 1127          Cognition    Orientation Status (Cognition) oriented x 3  -KG     Row Name 23 1127          Safety Issues, Functional Mobility    Safety Issues Affecting Function (Mobility) safety precaution awareness;safety precautions follow-through/compliance  -KG     Impairments Affecting Function (Mobility) balance;coordination;endurance/activity tolerance;strength  -KG           User Key  (r) =  Recorded By, (t) = Taken By, (c) = Cosigned By    Initials Name Provider Type    KG Meghna Simental PT Physical Therapist               Mobility     Row Name 01/05/23 1127          Bed Mobility    Bed Mobility sit-supine  -KG     Sit-Supine Sutton (Bed Mobility) standby assist;verbal cues  -KG     Assistive Device (Bed Mobility) head of bed elevated  -KG     Comment, (Bed Mobility) VC's for sequencing. Pt did not require any physical assistance.  -KG     Row Name 01/05/23 1127          Transfers    Comment, (Transfers) VC's for sequencing.  -KG     Row Name 01/05/23 1127          Sit-Stand Transfer    Sit-Stand Sutton (Transfers) standby assist;verbal cues  -KG     Row Name 01/05/23 1127          Gait/Stairs (Locomotion)    Sutton Level (Gait) contact guard;verbal cues  -KG     Assistive Device (Gait) other (see comments)  B UE support on tripod monitor  -KG     Distance in Feet (Gait) 440  -KG     Deviations/Abnormal Patterns (Gait) stride length decreased  -KG     Bilateral Gait Deviations forward flexed posture;heel strike decreased  -KG     Comment, (Gait/Stairs) Pt demonstrated step through gait pattern with appropriate speed. Cues for upright posture with increased stride length. Pt demonstrated good balance and stability. Denied any c/o pain.  -KG           User Key  (r) = Recorded By, (t) = Taken By, (c) = Cosigned By    Initials Name Provider Type    Meghna Walker PT Physical Therapist               Obj/Interventions     Row Name 01/05/23 1128          Balance    Balance Assessment sitting static balance;standing static balance;standing dynamic balance  -KG     Static Sitting Balance independent  -KG     Position, Sitting Balance unsupported;sitting in chair  -KG     Static Standing Balance standby assist  -KG     Dynamic Standing Balance contact guard  -KG     Position/Device Used, Standing Balance supported  -KG           User Key  (r) = Recorded By, (t) = Taken By,  (c) = Cosigned By    Initials Name Provider Type    Meghna Walker, PT Physical Therapist               Goals/Plan    No documentation.                Clinical Impression     Row Name 01/05/23 1128          Pain    Pretreatment Pain Rating 0/10 - no pain  -KG     Posttreatment Pain Rating 0/10 - no pain  -KG     Row Name 01/05/23 1128          Plan of Care Review    Plan of Care Reviewed With patient  -KG     Progress improving  -KG     Outcome Evaluation Pt increased ambulation distance to 440ft with CGA and B UE support. Pt demonstrated good balance and stability with appropriate gait speed. Intermittent cues for upright posture with increased stride length. Pt denied any c/o pain. Continue to progress as appropriate.  -KG     [facility] Name 01/05/23 1128          Vital Signs    Pre Systolic BP Rehab 94  -KG     Pre Treatment Diastolic BP 59  -KG     Post Systolic BP Rehab 108  -KG     Post Treatment Diastolic BP 58  -KG     Pretreatment Heart Rate (beats/min) 86  -KG     Posttreatment Heart Rate (beats/min) 89  -KG     Pre SpO2 (%) 95  -KG     O2 Delivery Pre Treatment room air  -KG     Post SpO2 (%) 94  -KG     O2 Delivery Post Treatment room air  -KG     Pre Patient Position Sitting  -KG     Intra Patient Position Standing  -KG     Post Patient Position Supine  -KG     Row Name 01/05/23 1128          Positioning and Restraints    Pre-Treatment Position sitting in chair/recliner  -KG     Post Treatment Position bed  -KG     In Bed notified nsg;supine;call light within reach;encouraged to call for assist;side rails up x2;RUE elevated;LUE elevated;legs elevated  -KG           User Key  (r) = Recorded By, (t) = Taken By, (c) = Cosigned By    Initials Name Provider Type    Meghna Walker, PT Physical Therapist               Outcome Measures     [facility] Name 01/05/23 1130 01/05/23 0800       How much help from another person do you currently need...    Turning from your back to your side while in flat bed  without using bedrails? 4  -KG 3  -KK    Moving from lying on back to sitting on the side of a flat bed without bedrails? 3  -KG 3  -KK    Moving to and from a bed to a chair (including a wheelchair)? 3  -KG 3  -KK    Standing up from a chair using your arms (e.g., wheelchair, bedside chair)? 3  -KG 4  -KK    Climbing 3-5 steps with a railing? 3  -KG 3  -KK    To walk in hospital room? 3  -KG 3  -KK    AM-PAC 6 Clicks Score (PT) 19  -KG 19  -KK    Highest level of mobility 6 --> Walked 10 steps or more  -KG 6 --> Walked 10 steps or more  -KK    Row Name 01/05/23 1130          Functional Assessment    Outcome Measure Options AM-PAC 6 Clicks Basic Mobility (PT)  -KG           User Key  (r) = Recorded By, (t) = Taken By, (c) = Cosigned By    Initials Name Provider Type    [place] RN Registered Nurse    Meghna Walker, PT Physical Therapist                             Physical Therapy Education     Title: PT OT SLP Therapies (Done)     Topic: Physical Therapy (Done)     Point: Mobility training (Done)     Learning Progress Summary           Patient Acceptance, E, VU,NR by KG at 1/5/2023 0939    Acceptance, E,TB, VU,NR by  at 1/4/2023 0951    Acceptance, E,TB, VU,NR by  at 1/3/2023 1610                   Point: Home exercise program (Done)     Learning Progress Summary           Patient Acceptance, E, VU,NR by KG at 1/5/2023 0939    Acceptance, E,TB, VU,NR by  at 1/4/2023 0951                   Point: Body mechanics (Done)     Learning Progress Summary           Patient Acceptance, E, VU,NR by KG at 1/5/2023 0939    Acceptance, E,TB, VU,NR by  at 1/4/2023 0951    Acceptance, E,TB, VU,NR by  at 1/3/2023 1610                   Point: Precautions (Done)     Learning Progress Summary           Patient Acceptance, E, VU,NR by KG at 1/5/2023 0939    Acceptance, E,TB, VU,NR by  at 1/4/2023 0951    Acceptance, E,TB, VU,NR by  at 1/3/2023 1610                               User Key      Initials Effective Dates Name Provider Type Discipline    KG 05/22/20 -  Meghna Simental PT Physical Therapist PT     09/22/22 -  Jacklyn Rangel PT Physical Therapist PT              PT Recommendation and Plan     Plan of Care Reviewed With: patient  Progress: improving  Outcome Evaluation: Pt increased ambulation distance to 440ft with CGA and B UE support. Pt demonstrated good balance and stability with appropriate gait speed. Intermittent cues for upright posture with increased stride length. Pt denied any c/o pain. Continue to progress as appropriate.     Time Calculation:    PT Charges     Row Name 01/05/23 0939             Time Calculation    Start Time 0939  -KG      PT Received On 01/05/23  -KG      PT Goal Re-Cert Due Date 01/13/23  -KG         Time Calculation- PT    Total Timed Code Minutes- PT 14 minute(s)  -KG         Timed Charges    79197 - Gait Training Minutes  14  -KG         Total Minutes    Timed Charges Total Minutes 14  -KG       Total Minutes 14  -KG            User Key  (r) = Recorded By, (t) = Taken By, (c) = Cosigned By    Initials Name Provider Type    KG Meghna Simental, PT Physical Therapist              Therapy Charges for Today     Code Description Service Date Service Provider Modifiers Qty    25859198472 HC GAIT TRAINING EA 15 MIN 1/5/2023 Meghna Simental, PT GP 1          PT G-Codes  Outcome Measure Options: AM-PAC 6 Clicks Basic Mobility (PT)  AM-PAC 6 Clicks Score (PT): 19       Kiara Simentla PT  1/5/2023

## 2023-01-05 NOTE — PROGRESS NOTES
[facility] Cardiology Daily Note       LOS: 6 days   Patient Care Team:  Avery Sheldon MD as PCP - General (Family Medicine)    Chief Complaint: NSTEMI/VF    Subjective     Subjective:   Denies chest pain, dyspnea, lightheadedness.  Ambulated without difficulty.    Has not been symptomatic with his low blood pressure.  Yonathan-Synephrine off as of this morning.    Medications:  aspirin, 81 mg, Oral, Daily  digoxin, 250 mcg, Oral, Daily  ipratropium-albuterol, 3 mL, Nebulization, 4x Daily - RT  midodrine, 15 mg, Oral, TID AC  nicotine, 1 patch, Transdermal, Q24H  pantoprazole, 40 mg, Oral, Q AM  pharmacy consult - [place], , Does not apply, Daily  prasugrel, 5 mg, Oral, Daily  rosuvastatin, 20 mg, Oral, Nightly        Objective     Vital Sign Min/Max for last 24 hours  Temp  Min: 97.5 °F (36.4 °C)  Max: 98.7 °F (37.1 °C)   BP  Min: 74/51  Max: 114/69   Pulse  Min: 46  Max: 84   Resp  Min: 14  Max: 20   SpO2  Min: 90 %  Max: 98 %   Flow (L/min)  Min: 2  Max: 2   Weight  Min: 62.8 kg (138 lb 7.2 oz)  Max: 62.8 kg (138 lb 7.2 oz)     Physical Exam:  CONSTITUTIONAL: No acute distress    Results Review:     Tele: Sinus rhythm    Labs:    Estimated Creatinine Clearance: 40.2 mL/min (A) (by C-G formula based on SCr of 1.39 mg/dL (H)).    Results for orders placed during the hospital encounter of 12/30/22    Adult Transthoracic Echo Complete w/ Color, Spectral and Contrast if Necessary Per Protocol    Interpretation Summary  •  Left ventricular systolic function is moderately decreased. Left ventricular ejection fraction appears to be 31 - 35%.  •  The following left ventricular wall segments are hypokinetic: apical lateral, basal inferolateral, mid inferolateral, apical inferior, mid inferior and basal inferior.  •  The right ventricular cavity is mildly dilated.  •  Mild mitral valve regurgitation is present.  •  Mild tricuspid valve regurgitation is present.  •  Estimated right ventricular systolic  pressure from tricuspid regurgitation is mildly elevated (35-45 mmHg).      Assessment   Assessment:  1. Inferior STEMI, CAD  a. Status post LOGAN x1 to 100% RCA  2. Complete heart block  a. Status post temporary pacemaker wire during heart cath.  Removed prior to transfer to ICU  3. Intraprocedural ventricular fibrillation  a. Status post defibrillation x1 intra procedurally, conversion to sinus rhythm  b. On amiodarone briefly.  No discontinued.  4. Acute systolic heart failure, RV dilatation, possible RV dysfunction  a. EF 45% with inferior hypokinesis  5. Recent influenza  6. Tobacco use, discontinued 1 month ago    Plan:  1. Repeat echo ordered and pending for today  2. Increasing midodrine to 15 mg 3 times daily.  3. Decreasing digoxin to 62.5 mcg once daily, renal dosing  4. Continue aspirin and Effient therapy status post stent to the RCA. (Maintenance dose 5 mg daily as age greater than 75)   5. High intensity statin  6. Beta-blocker if/when blood pressure allows off of vasopressor  7. ACEI/ARB/ARNi/MRA/SGLT2i when blood pressure allows   8. PT follow    9. Transfer to telemetry today  10. Goal MAP on the floor of greater than 55 mmHg  11. Patient has continued to remain asymptomatic with his hypotension.  If remains clinically stable tomorrow, recommend discharge home with home health PT.  Follow-up with cardiology in 4 to 6 weeks      Kieran Reddy MD  01/05/23  11:58 EST

## 2023-01-05 NOTE — PLAN OF CARE
Goal Outcome Evaluation:  Plan of Care Reviewed With: patient           Outcome Evaluation: PT received alert and oriented x4. Pt assisted to bed with 1 assist- tolerated well. PT on RA most of the night. Placed on 3LNC for a couple of hours and then transitioned to RA. PT SBP dropped below 60 and martin restarted and infusing @ .3mcg/kg/min. Pt had a total of 900mL OUP. AM labs drawn- no replacement required.

## 2023-01-05 NOTE — PLAN OF CARE
Goal Outcome Evaluation:  Plan of Care Reviewed With: patient        Progress: improving  Outcome Evaluation: Pt increased ambulation distance to 440ft with CGA and B UE support. Pt demonstrated good balance and stability with appropriate gait speed. Intermittent cues for upright posture with increased stride length. Pt denied any c/o pain. Continue to progress as appropriate.

## 2023-01-05 NOTE — PROGRESS NOTES
Staff was asked by case management to see patient in regards to Phase II Cardiac Rehab referral. Staff spoke with patient and discussed benefits of program. Teach back verified. Patient states that he would like to participate in Cardiac Rehab at [facility]. Staff faxed referral to [facility] on 1/3/22. [facility] will contact Patient after discharge from the hospital to schedule an appointment. Staff available if additional assistance needed.

## 2023-01-05 NOTE — PROGRESS NOTES
INTENSIVIST   PROGRESS NOTE        SUBJECTIVE     Tucker 77 y.o. male is followed for: No chief complaint on file.       I21.19, STEMI S/P LOGAN RCA 12/30/22    PEA arrest (HCC)    Acute systolic heart failure (HCC)    Intraprocedural VF (ventricular fibrillation) (HCC)    Intraprocedural Complete heart block (HCC)    Tobacco use    COPD (chronic obstructive pulmonary disease) (HCC)    GERD (gastroesophageal reflux disease)    Chronic hypoxemic respiratory failure (HCC)    Prediabetes    RICKIE (acute kidney injury) on probable CKD    As an Intensivist, we provide an integrated approach to the ICU patient and family, medical management of comorbid conditions, including but not limited to electrolytes, glycemic control, organ dysfunction, lead interdisciplinary rounds and coordinate the care with all other services, including those from other specialists.     Interval History:  POD: 6 Days Post-Op    On/Off martin-synephrine.  On Ambient air <-> LFNC.  Overall doing OK.    Device (Oxygen Therapy): room air (01/05/23 1214): Flow (L/min): 2 (01/04/23 2200),       The patient qualifies to receive the vaccine, but they have not yet received it.     Temp  Min: 97.5 °F (36.4 °C)  Max: 98.7 °F (37.1 °C)       History     Last Reviewed by Marcos Judge MD on 1/3/2023 at  6:34 PM    Sections Reviewed    Medical, Family, Surgical, Tobacco, Alcohol, Drug Use, Sexual Activity,   Social Documentation      Problem list reviewed by Marcos Judge MD on 1/3/2023 at  6:34 PM  Medicines reviewed by Marcos Judge MD on 1/3/2023 at  6:34 PM  Allergies reviewed by Marcos Judge MD on 1/3/2023 at  6:33 PM       The patient's relevant past medical, surgical and social history were reviewed and updated in Epic as appropriate.        OBJECTIVE     Vitals:  Temp: 97.5 °F (36.4 °C) (01/05/23 0900) Temp  Min: 97.5 °F (36.4 °C)  Max: 98.7 °F (37.1 °C)   Temp core:      BP: (!) 78/50 (01/05/23 1401) BP  Min: 74/51  Max: 113/73   MAP (non-invasive)  Noninvasive MAP (mmHg): 62 (23 1030) Noninvasive MAP (mmHg)  Av.2  Min: 50  Max: 108   Pulse: 79 (23 1401) Pulse  Min: 54  Max: 84   Resp: 18 (23 121) Resp  Min: 14  Max: 20   SpO2: 92 % (23 121) SpO2  Min: 90 %  Max: 98 %   Device: room air (23 121)    Flow Rate: 2 (23 2200) Flow (L/min)  Min: 2  Max: 2         22  1848 23  0500   Weight: 59 kg (130 lb 1.1 oz) 62.8 kg (138 lb 7.2 oz)        Intake/Ouptut 24 hrs (7:00AM - 6:59 AM)  Intake & Output (last 3 days)        07 07 0701   07 0701   07 07 07    P.O. 480   480    I.V. (mL/kg) 1110 (18.8) 208 (3.5) 131.4 (2.1)     Total Intake(mL/kg) 1590 (26.9) 208 (3.5) 131.4 (2.1) 480 (7.6)    Urine (mL/kg/hr) 2050 (1.4) 1250 (0.9) 1200 (0.8) 350 (0.7)    Stool   1     Total Output  1250 1201 350    Net -460 -1042 -1069.6 +130            Urine Unmeasured Occurrence   1 x     Stool Unmeasured Occurrence   1 x         Hemodynamics:  SV SV (mL): 74 mL (23 06) Normal:    SVI SVI (mL/min/m2): 45 mL/min/m2 (23) Normal: 33-47   SVV SVV : 12 (23 1800)% Normal: < 10-15%   CO CO (L/min): 4.6 L/min (23 06) Normal: 4-8 L/min   CI CI (L/min/m2): 2.8 L/min/m2 (01/05/23 0600) Normal: 2.5-4 L/min/m2         Medications (drips):  phenylephrine, Last Rate: Stopped (23 0901)      Physical Examination  Telemetry:  Rhythm: normal sinus rhythm (23 1000)  Atrial Rhythm: atrial wandering pacemaker (23 1400)      Constitutional:  No acute distress.   Cardiovascular: RRR.    Respiratory: Normal breath sounds  No adventitious sounds.   Abdominal:  Soft with no tenderness.   Extremities: No Edema   Neurological:   Alert, Oriented, Cooperative.  Best Eye Response: 4-->(E4) spontaneous (23 1000)  Best Motor Response: 6-->(M6) obeys commands (23 1000)  Best Verbal Response: 5-->(V5) oriented (23 1000)  Branden  Coma Scale Score: 15 (01/05/23 1000)     Results Reviewed:  Laboratory  Microbiology  Radiology  Pathology    Hematology:  Results from last 7 days   Lab Units 01/05/23  0416 01/04/23  0259   WBC 10*3/mm3 10.64 14.19*   HEMOGLOBIN g/dL 10.4* 10.6*   MCV fL 98.8* 98.8*   PLATELETS 10*3/mm3 406 403   NEUTROS ABS 10*3/mm3 5.77 8.73*   LYMPHS ABS 10*3/mm3 3.21* 3.74*   EOS ABS 10*3/mm3 0.32 0.25       Chemistry:  Estimated Creatinine Clearance: 40.2 mL/min (A) (by C-G formula based on SCr of 1.39 mg/dL (H)).    Results from last 7 days   Lab Units 01/05/23  0416 01/04/23  0259   SODIUM mmol/L 136 137   POTASSIUM mmol/L 4.9 4.7   CHLORIDE mmol/L 105 105   CO2 mmol/L 22.0 23.0   BUN mg/dL 38* 31*   CREATININE mg/dL 1.39* 1.11   GLUCOSE mg/dL 104* 101*     Results from last 7 days   Lab Units 01/05/23  0416 01/04/23  0259   CALCIUM mg/dL 8.2* 8.4*   MAGNESIUM mg/dL 2.5* 1.7   PHOSPHORUS mg/dL 3.3 2.6       Images:  XR Chest 1 View    Result Date: 1/5/2023  Impression: Unchanged minimal basilar atelectasis more notable on the left. There is no new focal consolidation. There is no significant effusion or distinct pneumothorax. Unchanged heart and mediastinal contours. Electronically Signed: Ron Salazar  1/5/2023 8:40 AM EST  Workstation ID: [id number]    XR Chest 1 View    Result Date: 1/4/2023  Impression: Stable mild bibasilar interstitial lung changes, whether mild atelectasis, pneumonia or scarring. No new or progressive chest pathology is seen. Electronically Signed: Garrett Castillo  1/4/2023 3:38 PM EST  Workstation ID: [id number]      Echo:  Results for orders placed during the hospital encounter of 12/30/22    Adult Transthoracic Echo Complete w/ Color, Spectral and Contrast if Necessary Per Protocol    Interpretation Summary  •  Left ventricular systolic function is moderately decreased. Left ventricular ejection fraction appears to be 31 - 35%.  •  The following left ventricular wall segments are hypokinetic: apical  lateral, basal inferolateral, mid inferolateral, apical inferior, mid inferior and basal inferior.  •  The right ventricular cavity is mildly dilated.  •  Mild mitral valve regurgitation is present.  •  Mild tricuspid valve regurgitation is present.  •  Estimated right ventricular systolic pressure from tricuspid regurgitation is mildly elevated (35-45 mmHg).      Results: Reviewed.  I reviewed the patient's new laboratory and imaging results.  I independently reviewed the patient's new images.    Medications: Reviewed.    Assessment   A/P     Hospital:  LOS: 6 days   ICU: 5d 23h      Diagnosis   • **I21.19, STEMI S/P LOGAN RCA 22 [I21.19]   • PEA arrest ([facility]) [I46.9]   • Acute systolic heart failure ([facility]) [I50.21]   • Intraprocedural VF (ventricular fibrillation) ([facility]) [I49.01]   • Intraprocedural Complete heart block ([facility]) [I44.2]   • Tobacco use [Z72.0]   • COPD (chronic obstructive pulmonary disease) ([facility]) [J44.9]   • GERD (gastroesophageal reflux disease) [K21.9]   • Chronic hypoxemic respiratory failure ([facility]) [J96.11]       • Prediabetes [R73.03]   • RICKIE (acute kidney injury) on probable CKD [N17.9]     Tuckre is a 77 y.o. male admitted on 2022 with STEMI (ST elevation myocardial infarction) ([facility]) [I21.3]    Assessment/Management/Treatment Plan:    1. STEMI - inferior wall.  a. S/P LOGAN RCA 2022. Intraprocedure V Fib and complete heart block.  b. Hypotension - Dopamine ? Yonathan-synephrine continuous intravenous infusion on/off  c. HFrEF  2. Pulmonary   a. COPD no exacerbation  b. Home Oxygen (2L/m)  c. Tobacco use but quit 1 month prior to admission  3. GI/Hepatology  a. GERD  4. Renal  a. RICKIE    5. Endocrine  a. Body mass index is 23.04 kg/m². Normal: 18.5-24.9kg/m2  b. At risk for DM (pre-diabetes) based on his HbA1c. [HbA1C 5.7%-6.4%, eA-139 mg/dL].     Lab Results   Lab Value Date/Time    HGBA1C 6.10 (H) 2022 1609     Results from last 7 days   Lab Units 23  0748 23  01/04/23  1611 01/04/23  1117 01/04/23  0724 01/03/23  2005 01/03/23  1559 01/03/23  1128   GLUCOSE mg/dL 96 115 103 96 97 118 113 117       Diet: Diet: Cardiac Diets, Diabetic Diets; Healthy Heart (2-3 Na+); Consistent Carbohydrate; Texture: Regular Texture (IDDSI 7); Fluid Consistency: Thin (IDDSI 0)  Orders Placed This Encounter      DIET MESSAGE Pt would like a cheese burger and BBQ chips for dinner please :)      Advance Directives: Code Status and Medical Interventions:   Ordered at: 12/30/22 1538     Level Of Support Discussed With:    Patient     Code Status (Patient has no pulse and is not breathing):    CPR (Attempt to Resuscitate)     Medical Interventions (Patient has pulse or is breathing):    Full        DVT prophylaxis:  No DVT prophylaxis order currently exists.     In brief:  1. ClearSight for non-invasive monitoring.  1. Fluid challenge prn  2. Wean pressors.  3. Midodrine per Cardiology (increased to 15 mg TID)  4. Follow up ECHO today.  5. Follow up renal function.  6. Disposition: Transfer to Telemetry Unit if able to stay off pressors.    Plan of care and goals reviewed during interdisciplinary rounds.  I discussed the patient's findings and my recommendations with patient and nursing staff    Time: I spent 35 minutes.    Time includes time spent before, during and after visit on the day of the encounter, evaluating patient, reviewing records and ordering labs or other diagnostic studies, communicating with other health care professionals, documenting the encounter and coordinating care.    This is non-concurrent time.           [] Primary Attending Intensive Care Medicine - Nutrition Support   [x] Consultant

## 2023-01-06 ENCOUNTER — READMISSION MANAGEMENT (OUTPATIENT)
Dept: CALL CENTER | Facility: HOSPITAL | Age: 78
End: 2023-01-06
Payer: MEDICARE

## 2023-01-06 VITALS
TEMPERATURE: 96.8 F | OXYGEN SATURATION: 93 % | HEART RATE: 62 BPM | SYSTOLIC BLOOD PRESSURE: 99 MMHG | WEIGHT: 138 LBS | HEIGHT: 65 IN | DIASTOLIC BLOOD PRESSURE: 63 MMHG | BODY MASS INDEX: 22.99 KG/M2 | RESPIRATION RATE: 16 BRPM

## 2023-01-06 PROCEDURE — 99239 HOSP IP/OBS DSCHRG MGMT >30: CPT | Performed by: INTERNAL MEDICINE

## 2023-01-06 RX ORDER — MIDODRINE HYDROCHLORIDE 5 MG/1
10 TABLET ORAL
Qty: 90 TABLET | Refills: 5 | Status: SHIPPED | OUTPATIENT
Start: 2023-01-06 | End: 2023-01-24 | Stop reason: SDUPTHER

## 2023-01-06 RX ORDER — ROSUVASTATIN CALCIUM 20 MG/1
20 TABLET, COATED ORAL NIGHTLY
Qty: 90 TABLET | Refills: 3 | Status: SHIPPED | OUTPATIENT
Start: 2023-01-06

## 2023-01-06 RX ORDER — ASPIRIN 81 MG/1
81 TABLET ORAL DAILY
Qty: 90 TABLET | Refills: 3 | Status: SHIPPED | OUTPATIENT
Start: 2023-01-07

## 2023-01-06 RX ORDER — PRASUGREL 5 MG/1
5 TABLET, FILM COATED ORAL DAILY
Qty: 30 TABLET | Refills: 11 | Status: SHIPPED | OUTPATIENT
Start: 2023-01-07 | End: 2023-01-24 | Stop reason: SDUPTHER

## 2023-01-06 RX ADMIN — DIGOXIN 62.5 MCG: 125 TABLET ORAL at 12:00

## 2023-01-06 RX ADMIN — MIDODRINE HYDROCHLORIDE 15 MG: 10 TABLET ORAL at 06:30

## 2023-01-06 RX ADMIN — PANTOPRAZOLE SODIUM 40 MG: 40 TABLET, DELAYED RELEASE ORAL at 06:28

## 2023-01-06 RX ADMIN — ASPIRIN 81 MG: 81 TABLET, COATED ORAL at 08:22

## 2023-01-06 RX ADMIN — MIDODRINE HYDROCHLORIDE 15 MG: 10 TABLET ORAL at 11:53

## 2023-01-06 RX ADMIN — Medication 1 PATCH: at 08:22

## 2023-01-06 RX ADMIN — PRASUGREL 5 MG: 10 TABLET, FILM COATED ORAL at 08:21

## 2023-01-06 NOTE — PROGRESS NOTES
Enter Query Response Below      Query Response:       Cardiogenic shock    Electronically signed by Kieran Reddy MD, 23, 1:31 PM EST.         If applicable, please update the problem list.     Patient: Tucker Ambrosio        : 1945  Account: [id number]           Admit Date:         How to Respond to this query:       a. Click New Note     b. Answer query within the yellow box.                c. Update the Problem List, if applicable.      If you have any questions about this query contact me at: [email or web address]     Dr. Reddy,    Patient presented with STEMI and out-of-hospital cardiac arrest, with 3 minutes of CPR.  He underwent heart cath , and experienced intra-procedural ventricular fibrillation, requiring defibrillation x1, with conversion to sinus rhythm.  He is diagnosed with persistent hypotension since his arrival to ICU ().  Blood pressure readings () with lowest systolic pressures 70s-80s, diastolic pressures 50s, and MAP 62-70.  Treatment includes Dopamine (-1/3), Yonathan-synephrine (1/3-), IV fluids, Albumin and Midodrine.    Please specify if the patient is treated/monitored for:    *Cardiogenic shock  *Hypotension only  *Other _________  *Unable to determine    By submitting this query, we are merely seeking further clarification of documentation to accurately reflect all conditions that you are monitoring, evaluating, treating or that extend the hospitalization or utilize additional resources of care. Please utilize your independent clinical judgment when addressing the question(s) above.     This query and your response, once completed, will be entered into the legal medical record.    Sincerely,  Any CONWAY, RN  Clinical Documentation Integrity Program   [email or web address]

## 2023-01-06 NOTE — CASE MANAGEMENT/SOCIAL WORK
Case Management Discharge Note      Final Note: Spoke with patient at bedside. Patient is discharging home today with  services. CM put in resume orders and called [facility]. Patient discharge plan is home with [facility] for PT, OT and SN. No other discharge needs at this time.    Provided Post Acute Provider List?: N/A  N/A Provider List Comment: Reports being already current with a home health provider  Provided Post Acute Provider Quality & Resource List?: N/A    Selected Continued Care - Admitted Since 12/30/2022     Destination    No services have been selected for the patient.              Durable Medical Equipment    No services have been selected for the patient.              Dialysis/Infusion    No services have been selected for the patient.              Home Medical Care Coordination complete.    Service Provider Selected Services Address Phone Fax Patient Preferred    [facility] [address], [facility] [facility] [address] [phone] --          Therapy    No services have been selected for the patient.              Community [facility]    No services have been selected for the patient.              [facility]    No services have been selected for the patient.                       Final Discharge Disposition Code: 06 - home with home health care

## 2023-01-06 NOTE — DISCHARGE INSTR - APPOINTMENTS
You have an appointment with Avery Sheldon MD on January 10, 2023 @ 1:00 PM.   Call them if you have any questions. Phone: [phone] [address] [facility] [address]

## 2023-01-06 NOTE — PLAN OF CARE
Goal Outcome Evaluation:  Plan of Care Reviewed With: patient   BP's have been better this shift.  Patient requires 2L NC when asleep.  Sinus enedelia on telemetry with rates in the low-mid 50's.  Patient is oriented x4. Voices no complaints/concerns at this time     Progress: improving

## 2023-01-06 NOTE — PLAN OF CARE
Problem: Adult Inpatient Plan of Care  Goal: Plan of Care Review  Outcome: Adequate for Care Transition  Flowsheets  Taken 1/6/2023 1258 by Ysabel Lemon, RN  Plan of Care Reviewed With: patient  Outcome Evaluation: D/C home  Taken 1/6/2023 0515 by Inessa Wayne, TAMARA  Progress: improving   Goal Outcome Evaluation:  Plan of Care Reviewed With: patient           Outcome Evaluation: D/C home

## 2023-01-06 NOTE — DISCHARGE SUMMARY
[facility]  DISCHARGE SUMMARY    Admission Date: 12/30/2022       Date of Discharge:  1/6/2023    Discharge Diagnosis: STEMI    Presenting Problem/History of Present Illness  STEMI (ST elevation myocardial infarction) (HCC) [I21.3]    Hospital Course  Patient is a 77 y.o. male presented with PEA arrest in the field, inferior STEMI.  Underwent emergent heart cath.  Status post LOGAN x1 to 100% thrombotic RCA occlusion.  Intraprocedural he had complete heart block, status post temporary pacemaker wire placement.  Also had intraprocedural ventricular fibrillation status post defibrillation.  Recovered over the course of several days in the hospital.  Largest difficulty was refractory hypotension.  Treated with vasopressors, digoxin for possible RV dysfunction.  Slowly improved.  Started on midodrine.  Stable for discharge today.    Recommend close follow-up in the heart valve clinic in 1 week to reassess blood pressure.  Titrate midodrine as needed.  Consider down titration if systolic above 110 mmHg.  If systolic is in the low 90s, consider increasing midodrine back up to 15 mg 3 times daily    Consults:   Consults     No orders found from 12/1/2022 to 12/31/2022.          Pertinent Test/Procedure Results: See EMR    Condition on Discharge:  Stable    Physical Exam at Discharge  Vital Signs  Temp:  [96.8 °F (36 °C)-98.4 °F (36.9 °C)] 96.8 °F (36 °C)  Heart Rate:  [54-96] 74  Resp:  [14-20] 14  BP: ()/(49-73) 110/73  Physical Exam:  GEN: No acute distress  PULM: Mild rhonchi  CV: Regular rate and rhythm    Discharge Disposition: Home    Discharge Diet: Cardiac heart healthy diet    Activity at Discharge: As tolerated    Follow-up Appointments  No future appointments.  Additional Instructions for the Follow-ups that You Need to Schedule     Ambulatory Referral to Cardiac Rehab   As directed      Discharge Follow-up with Specialty: General cardiology clinic with CAPRI Partida or  Dr. Reddy in 6 weeks   As directed      Specialty: General cardiology clinic with CAPRI Partida or Dr. Reddy in 6 weeks         Discharge Follow-up with Specialty: Heart and valve clinic 1 week   As directed      Specialty: Heart and valve clinic 1 week               Discharge Medications     Discharge Medications      New Medications      Instructions Start Date   aspirin 81 MG EC tablet   81 mg, Oral, Daily   Start Date: January 7, 2023     midodrine 5 MG tablet  Commonly known as: PROAMATINE   10 mg, Oral, 3 Times Daily Before Meals      prasugrel 5 MG tablet  Commonly known as: EFFIENT   5 mg, Oral, Daily   Start Date: January 7, 2023     rosuvastatin 20 MG tablet  Commonly known as: CRESTOR   20 mg, Oral, Nightly         Continue These Medications      Instructions Start Date   albuterol sulfate  (90 Base) MCG/ACT inhaler  Commonly known as: PROVENTIL HFA;VENTOLIN HFA;PROAIR HFA   2 puffs, Inhalation, Every 6 Hours PRN      gabapentin 100 MG capsule  Commonly known as: NEURONTIN   200 mg, Oral, 2 Times Daily      GUAIFENESIN ER PO   600 mg, Oral, 2 Times Daily      ipratropium-albuterol 0.5-2.5 mg/3 ml nebulizer  Commonly known as: DUO-NEB   3 mL, Nebulization, Every 4 Hours PRN      mometasone-formoterol 200-5 MCG/ACT inhaler  Commonly known as: DULERA 200   2 puffs, Inhalation, 2 Times Daily - RT      nicotine 21 MG/24HR patch  Commonly known as: NICODERM CQ   1 patch, Transdermal, Every 24 Hours      omeprazole 20 MG capsule  Commonly known as: priLOSEC   20 mg, Oral, Daily      oxyCODONE 10 MG tablet  Commonly known as: ROXICODONE   10 mg, Oral, Every 6 Hours PRN      polyethylene glycol 17 g packet  Commonly known as: MIRALAX   17 g, Oral, Daily PRN              Kieran Reddy MD  01/06/23  10:01 EST    Time: I spent 35 minutes on this discharge activity which included: face-to-face encounter with the patient, reviewing the data in the system, coordination of the care with the nursing staff as  well as consultants, documentation, and entering orders.

## 2023-01-07 NOTE — OUTREACH NOTE
Prep Survey    Flowsheet Row Responses   [facility] facility patient discharged from? [facility]   Is LACE score < 7 ? No   Eligibility Readm Mgmt   Discharge diagnosis STEMI   Does the patient have one of the following disease processes/diagnoses(primary or secondary)? Acute MI (STEMI,NSTEMI)   Does the patient have Home health ordered? Yes   What is the Home health agency?  [facility]   Is there a DME ordered? No   Prep survey completed? Yes          ELISHA MAURICIO - Registered Nurse

## 2023-01-09 LAB
QT INTERVAL: 376 MS
QTC INTERVAL: 423 MS

## 2023-01-09 NOTE — DISCHARGE PLACEMENT REQUEST
"Tucker Nelson (77 y.o. Male)     Date of Birth   1945    Social Security Number       Address   [address] [facility] [address]    Home Phone   [phone]    MRN   [id number]       Restorationist   Unknown    Marital Status   Unknown                            Admission Date   12/30/22    Admission Type   Emergency    Admitting Provider   Kieran Reddy MD    Attending Provider       Department, Room/Bed   [address], [address]       Discharge Date   1/6/2023    Discharge Disposition   Home or Self Care    Discharge Destination                               Attending Provider: (none)   Allergies: No Known Allergies    Isolation: None   Infection: None   Code Status: Prior    Ht: 165.1 cm (65\")   Wt: 62.6 kg (138 lb)    Admission Cmt: None   Principal Problem: I21.19, STEMI S/P LOGAN RCA 12/30/22 [I21.19]                 Active Insurance as of 12/30/2022     Primary Coverage     Payor Plan Insurance Group Employer/Plan Group    MEDICARE MEDICARE A & B      Payor Plan Address Payor Plan Phone Number Payor Plan Fax Number Effective Dates    [address] [phone]  6/1/1999 - None Entered    [place] [address]       Subscriber Name Subscriber Birth Date Member ID       TUCKER NELSON 1945 [id number]                 Emergency Contacts      (Rel.) Home Phone Work Phone Mobile Phone    JERRELL CHAN -- -- [phone]               History & Physical      Kieran Reddy MD at 12/30/22 1402           [facility]   [address] [place] [address], [address]  [place], [place], [address] [phone]  [email or web address]           INPATIENT HISTORY AND PHYSICAL NOTE    Chief complaint: Chest pain        HPI:    Tucker Nelson is a 77 y.o. male.    Cardiac focused problem list:  1. Chest pain syndrome  a. status post possible PEA arrest in the field, ROSC in 3 minutes  b. Inferior STEMI  2. Recent admission at OSH for flu, acute respiratory " failure  3. GERD  4. Chronic back pain  5. Skin cancer  6. Former tobacco dependence, 1 pack/day for 60 years, quit 11/2022, using nicotine patches  7. Surgical history: Appendectomy    The patient had a syncopal episode.  Family/bystanders started CPR.  EMS arrived.  Patient had possibly a total of around 3 minutes of CPR and ROSC was restored.  No defibrillation on the field.  EMS EKG revealed and ST elevation in the inferior leads.  Patient brought urgently to [facility] for heart [facility].    Worsening shortness of air for the last 6 months.  Particularly worse for the last few months.  Unable to walk from one end of the apartment to another.  Chest pain started today.    Review of Systems:  Positive for chest pain, distress  All other systems reviewed and are negative.    PFS:  There is no problem list on file for this patient.      No current facility-administered medications on file prior to encounter.     No current outpatient medications on file prior to encounter.     Not on File       No family history on file.        Objective:     Vital Sign Min/Max for last 24 hours  No data recorded   BP  Min: 112/66  Max: 118/71   Pulse  Min: 44  Max: 95   Resp  Min: 20  Max: 20   SpO2  Min: 90 %  Max: 97 %   No data recorded    No intake or output data in the 24 hours ending 12/30/22 1533        Vitals:    12/30/22 1511   BP: 112/66   Pulse: 95   Resp: 20   SpO2: 90%       CONSTITUTIONAL: In distress  SKIN: Warm and dry. No rashes noted  HEENT: Head is normocephalic and atraumatic. Mucous membranes are pink and moist.   NECK: Supple without masses or thyromegaly.   LUNGS: Normal effort.  Bilateral rhonchi  CARDIOVASCULAR: Regular rate and rhythm with a normal S1 and S2.  Soft systolic murmur  PERIPHERAL VASCULAR: Carotid upstroke, possible bruits versus radiating murmur. Radial pulses are 2+ bilaterally. There is no lower extremity edema.  2+ DP pulses bilaterally  ABDOMEN: Normal bowel sounds.  Soft with  no tenderness with palpitation. No hepatosplenomegaly  MUSCULOSKELETAL:  No digital cyanosis  NEUROLOGICAL: Nonfocal.  PSYCHIATRIC: Alert, orientated    Lab results reviewed by myself:  No results found for: CKTOTAL, CKMB, CKMBINDEX, TROPONINI, TROPONINT    No results found for: CHOL  No results found for: TRIG  No results found for: HDL  No results found for: LDL  No components found for: LDLDIRECTC    Diagnostic Data:    EKG: Sinus bradycardia with ST elevation in the inferior leads    Tele: Sinus rhythm with PVCs        Assessment and Plan:     Problem list:    * No active hospital problems. *      ASSESSMENT:  1. Inferior STEMI, CAD  a. Status post LOGAN x1 to 100% RCA  2. Complete heart block  a. Status post temporary pacemaker wire during heart cath.  Removed prior to transfer to ICU  3. Intraprocedural ventricular fibrillation  a. Status post defibrillation x1 intra procedurally, conversion to sinus rhythm  4. Acute systolic heart failure  a. EF 45% with inferior hypokinesis  5. Recent influenza    PLAN:  1. Admission to intensive care  2. Transthoracic echo, EKG, chest x-ray, labs  3. Continue cangrelor till 5 PM.  4. Load with Effient 60 mg after cangrelor discontinuation at 5 PM, maintenance dose of 5 mg daily (age greater than 75)  5. High intensity statin  6. Beta-blocker when blood pressure allows and off of vasopressors  7. Wean vasopressors to maintain a MAP greater than 65 mmHg  8. Remove venous line when no longer needed for IV infusions    Kieran Reddy MD, MSc, FACC, [facility]  Interventional Cardiology  [facility]              Electronically signed by Kieran Reddy MD at 12/30/22 0911       Physician Progress Notes (last 24 hours)  Notes from 01/08/23 1008 through 01/09/23 1008   No notes of this type exist for this encounter.         Physical Therapy Notes (last 24 hours)  Notes from 01/08/23 1008 through 01/09/23 1008   No notes exist for this encounter.         Occupational Therapy  Notes (last 48 hours)  Notes from 01/07/23 1008 through 01/09/23 1008   No notes exist for this encounter.          Discharge Summary      Kieran Reddy MD at 01/06/23 1001            [facility] Cardiology  DISCHARGE SUMMARY    Admission Date: 12/30/2022       Date of Discharge:  1/6/2023    Discharge Diagnosis: STEMI    Presenting Problem/History of Present Illness  STEMI (ST elevation myocardial infarction) (HCC) [I21.3]    Hospital Course  Patient is a 77 y.o. male presented with PEA arrest in the field, inferior STEMI.  Underwent emergent heart cath.  Status post LOGAN x1 to 100% thrombotic RCA occlusion.  Intraprocedural he had complete heart block, status post temporary pacemaker wire placement.  Also had intraprocedural ventricular fibrillation status post defibrillation.  Recovered over the course of several days in the hospital.  Largest difficulty was refractory hypotension.  Treated with vasopressors, digoxin for possible RV dysfunction.  Slowly improved.  Started on midodrine.  Stable for discharge today.    Recommend close follow-up in the heart valve clinic in 1 week to reassess blood pressure.  Titrate midodrine as needed.  Consider down titration if systolic above 110 mmHg.  If systolic is in the low 90s, consider increasing midodrine back up to 15 mg 3 times daily    Consults:   Consults     No orders found from 12/1/2022 to 12/31/2022.          Pertinent Test/Procedure Results: See EMR    Condition on Discharge:  Stable    Physical Exam at Discharge  Vital Signs  Temp:  [96.8 °F (36 °C)-98.4 °F (36.9 °C)] 96.8 °F (36 °C)  Heart Rate:  [54-96] 74  Resp:  [14-20] 14  BP: ()/(49-73) 110/73  Physical Exam:  GEN: No acute distress  PULM: Mild rhonchi  CV: Regular rate and rhythm    Discharge Disposition: Home    Discharge Diet: Cardiac heart healthy diet    Activity at Discharge: As tolerated    Follow-up Appointments  No future appointments.  Additional Instructions for the  Follow-ups that You Need to Schedule     Ambulatory Referral to Cardiac Rehab   As directed      Discharge Follow-up with Specialty: General cardiology clinic with CAPRI Partida or Dr. Reddy in 6 weeks   As directed      Specialty: General cardiology clinic with CPARI Partida or Dr. Reddy in 6 weeks         Discharge Follow-up with Specialty: Heart and valve clinic 1 week   As directed      Specialty: Heart and valve clinic 1 week               Discharge Medications     Discharge Medications      New Medications      Instructions Start Date   aspirin 81 MG EC tablet   81 mg, Oral, Daily   Start Date: January 7, 2023     midodrine 5 MG tablet  Commonly known as: PROAMATINE   10 mg, Oral, 3 Times Daily Before Meals      prasugrel 5 MG tablet  Commonly known as: EFFIENT   5 mg, Oral, Daily   Start Date: January 7, 2023     rosuvastatin 20 MG tablet  Commonly known as: CRESTOR   20 mg, Oral, Nightly         Continue These Medications      Instructions Start Date   albuterol sulfate  (90 Base) MCG/ACT inhaler  Commonly known as: PROVENTIL HFA;VENTOLIN HFA;PROAIR HFA   2 puffs, Inhalation, Every 6 Hours PRN      gabapentin 100 MG capsule  Commonly known as: NEURONTIN   200 mg, Oral, 2 Times Daily      GUAIFENESIN ER PO   600 mg, Oral, 2 Times Daily      ipratropium-albuterol 0.5-2.5 mg/3 ml nebulizer  Commonly known as: DUO-NEB   3 mL, Nebulization, Every 4 Hours PRN      mometasone-formoterol 200-5 MCG/ACT inhaler  Commonly known as: DULERA 200   2 puffs, Inhalation, 2 Times Daily - RT      nicotine 21 MG/24HR patch  Commonly known as: NICODERM CQ   1 patch, Transdermal, Every 24 Hours      omeprazole 20 MG capsule  Commonly known as: priLOSEC   20 mg, Oral, Daily      oxyCODONE 10 MG tablet  Commonly known as: ROXICODONE   10 mg, Oral, Every 6 Hours PRN      polyethylene glycol 17 g packet  Commonly known as: MIRALAX   17 g, Oral, Daily PRN              Kieran Reddy MD  01/06/23  10:01 EST    Time: I  spent 35 minutes on this discharge activity which included: face-to-face encounter with the patient, reviewing the data in the system, coordination of the care with the nursing staff as well as consultants, documentation, and entering orders.        Electronically signed by Kieran Reddy MD at 01/06/23 1000

## 2023-01-09 NOTE — DISCHARGE PLACEMENT REQUEST
"Tucker Nelson (77 y.o. Male)     Date of Birth   1945    Social Security Number       Address   [address] [facility] [address]    Home Phone   [phone]    MRN   [id number]       Nondenominational   Unknown    Marital Status   Unknown                            Admission Date   12/30/22    Admission Type   Emergency    Admitting Provider   Kieran Reddy MD    Attending Provider       Department, Room/Bed   [address], [address]/1       Discharge Date   1/6/2023    Discharge Disposition   Home or Self Care    Discharge Destination                               Attending Provider: (none)   Allergies: No Known Allergies    Isolation: None   Infection: None   Code Status: Prior    Ht: 165.1 cm (65\")   Wt: 62.6 kg (138 lb)    Admission Cmt: None   Principal Problem: I21.19, STEMI S/P LOGAN RCA 12/30/22 [I21.19]                 Active Insurance as of 12/30/2022     Primary Coverage     Payor Plan Insurance Group Employer/Plan Group    MEDICARE MEDICARE A & B      Payor Plan Address Payor Plan Phone Number Payor Plan Fax Number Effective Dates    [address] [phone]  6/1/1999 - None Entered    [place] [address]       Subscriber Name Subscriber Birth Date Member ID       TUCKER NELSON 1945 [id number]                 Emergency Contacts      (Rel.) Home Phone Work Phone Mobile Phone    JERRELL CHAN -- -- [phone]               History & Physical      Kieran Reddy MD at 12/30/22 1402           [facility]   [address] [place] [address], [address]  [place], [place], [address] [phone]  WWW.[facility]           INPATIENT HISTORY AND PHYSICAL NOTE    Chief complaint: Chest pain        HPI:    Tucker Nelson is a 77 y.o. male.    Cardiac focused problem list:  1. Chest pain syndrome  a. status post possible PEA arrest in the field, ROSC in 3 minutes  b. Inferior STEMI  2. Recent admission at OSH for flu, acute respiratory " failure  3. GERD  4. Chronic back pain  5. Skin cancer  6. Former tobacco dependence, 1 pack/day for 60 years, quit 11/2022, using nicotine patches  7. Surgical history: Appendectomy    The patient had a syncopal episode.  Family/bystanders started CPR.  EMS arrived.  Patient had possibly a total of around 3 minutes of CPR and ROSC was restored.  No defibrillation on the field.  EMS EKG revealed and ST elevation in the inferior leads.  Patient brought urgently to [facility] for heart cath.    Worsening shortness of air for the last 6 months.  Particularly worse for the last few months.  Unable to walk from one end of the apartment to another.  Chest pain started today.    Review of Systems:  Positive for chest pain, distress  All other systems reviewed and are negative.    PFS:  There is no problem list on file for this patient.      No current facility-administered medications on file prior to encounter.     No current outpatient medications on file prior to encounter.     Not on File       No family history on file.        Objective:     Vital Sign Min/Max for last 24 hours  No data recorded   BP  Min: 112/66  Max: 118/71   Pulse  Min: 44  Max: 95   Resp  Min: 20  Max: 20   SpO2  Min: 90 %  Max: 97 %   No data recorded    No intake or output data in the 24 hours ending 12/30/22 1533        Vitals:    12/30/22 1511   BP: 112/66   Pulse: 95   Resp: 20   SpO2: 90%       CONSTITUTIONAL: In distress  SKIN: Warm and dry. No rashes noted  HEENT: Head is normocephalic and atraumatic. Mucous membranes are pink and moist.   NECK: Supple without masses or thyromegaly.   LUNGS: Normal effort.  Bilateral rhonchi  CARDIOVASCULAR: Regular rate and rhythm with a normal S1 and S2.  Soft systolic murmur  PERIPHERAL VASCULAR: Carotid upstroke, possible bruits versus radiating murmur. Radial pulses are 2+ bilaterally. There is no lower extremity edema.  2+ DP pulses bilaterally  ABDOMEN: Normal bowel sounds.  Soft with  no tenderness with palpitation. No hepatosplenomegaly  MUSCULOSKELETAL:  No digital cyanosis  NEUROLOGICAL: Nonfocal.  PSYCHIATRIC: Alert, orientated    Lab results reviewed by myself:  No results found for: CKTOTAL, CKMB, CKMBINDEX, TROPONINI, TROPONINT    No results found for: CHOL  No results found for: TRIG  No results found for: HDL  No results found for: LDL  No components found for: LDLDIRECTC    Diagnostic Data:    EKG: Sinus bradycardia with ST elevation in the inferior leads    Tele: Sinus rhythm with PVCs        Assessment and Plan:     Problem list:    * No active hospital problems. *      ASSESSMENT:  1. Inferior STEMI, CAD  a. Status post LOGAN x1 to 100% RCA  2. Complete heart block  a. Status post temporary pacemaker wire during heart cath.  Removed prior to transfer to ICU  3. Intraprocedural ventricular fibrillation  a. Status post defibrillation x1 intra procedurally, conversion to sinus rhythm  4. Acute systolic heart failure  a. EF 45% with inferior hypokinesis  5. Recent influenza    PLAN:  1. Admission to intensive care  2. Transthoracic echo, EKG, chest x-ray, labs  3. Continue cangrelor till 5 PM.  4. Load with Effient 60 mg after cangrelor discontinuation at 5 PM, maintenance dose of 5 mg daily (age greater than 75)  5. High intensity statin  6. Beta-blocker when blood pressure allows and off of vasopressors  7. Wean vasopressors to maintain a MAP greater than 65 mmHg  8. Remove venous line when no longer needed for IV infusions    Kieran Reddy MD, MSc, FACC, [facility]  Interventional Cardiology  [facility]              Electronically signed by Kieran Reddy MD at 12/30/22 1929       Physician Progress Notes (last 24 hours)  Notes from 01/08/23 1006 through 01/09/23 1006   No notes of this type exist for this encounter.            Discharge Summary      Kieran Reddy MD at 01/06/23 1001            [facility] Card[facility]  DISCHARGE SUMMARY    Admission Date:  12/30/2022       Date of Discharge:  1/6/2023    Discharge Diagnosis: STEMI    Presenting Problem/History of Present Illness  STEMI (ST elevation myocardial infarction) (HCC) [I21.3]    Hospital Course  Patient is a 77 y.o. male presented with PEA arrest in the field, inferior STEMI.  Underwent emergent heart cath.  Status post LOGAN x1 to 100% thrombotic RCA occlusion.  Intraprocedural he had complete heart block, status post temporary pacemaker wire placement.  Also had intraprocedural ventricular fibrillation status post defibrillation.  Recovered over the course of several days in the hospital.  Largest difficulty was refractory hypotension.  Treated with vasopressors, digoxin for possible RV dysfunction.  Slowly improved.  Started on midodrine.  Stable for discharge today.    Recommend close follow-up in the heart valve clinic in 1 week to reassess blood pressure.  Titrate midodrine as needed.  Consider down titration if systolic above 110 mmHg.  If systolic is in the low 90s, consider increasing midodrine back up to 15 mg 3 times daily    Consults:   Consults     No orders found from 12/1/2022 to 12/31/2022.          Pertinent Test/Procedure Results: See EMR    Condition on Discharge:  Stable    Physical Exam at Discharge  Vital Signs  Temp:  [96.8 °F (36 °C)-98.4 °F (36.9 °C)] 96.8 °F (36 °C)  Heart Rate:  [54-96] 74  Resp:  [14-20] 14  BP: ()/(49-73) 110/73  Physical Exam:  GEN: No acute distress  PULM: Mild rhonchi  CV: Regular rate and rhythm    Discharge Disposition: Home    Discharge Diet: Cardiac heart healthy diet    Activity at Discharge: As tolerated    Follow-up Appointments  No future appointments.  Additional Instructions for the Follow-ups that You Need to Schedule     Ambulatory Referral to Cardiac Rehab   As directed      Discharge Follow-up with Specialty: General cardiology clinic with CAPRI Partida or Dr. Reddy in 6 weeks   As directed      Specialty: General cardiology clinic with  CAPRI Partida or Dr. Reddy in 6 weeks         Discharge Follow-up with Specialty: Heart and valve clinic 1 week   As directed      Specialty: Heart and valve clinic 1 week               Discharge Medications     Discharge Medications      New Medications      Instructions Start Date   aspirin 81 MG EC tablet   81 mg, Oral, Daily   Start Date: January 7, 2023     midodrine 5 MG tablet  Commonly known as: PROAMATINE   10 mg, Oral, 3 Times Daily Before Meals      prasugrel 5 MG tablet  Commonly known as: EFFIENT   5 mg, Oral, Daily   Start Date: January 7, 2023     rosuvastatin 20 MG tablet  Commonly known as: CRESTOR   20 mg, Oral, Nightly         Continue These Medications      Instructions Start Date   albuterol sulfate  (90 Base) MCG/ACT inhaler  Commonly known as: PROVENTIL HFA;VENTOLIN HFA;PROAIR HFA   2 puffs, Inhalation, Every 6 Hours PRN      gabapentin 100 MG capsule  Commonly known as: NEURONTIN   200 mg, Oral, 2 Times Daily      GUAIFENESIN ER PO   600 mg, Oral, 2 Times Daily      ipratropium-albuterol 0.5-2.5 mg/3 ml nebulizer  Commonly known as: DUO-NEB   3 mL, Nebulization, Every 4 Hours PRN      mometasone-formoterol 200-5 MCG/ACT inhaler  Commonly known as: DULERA 200   2 puffs, Inhalation, 2 Times Daily - RT      nicotine 21 MG/24HR patch  Commonly known as: NICODERM CQ   1 patch, Transdermal, Every 24 Hours      omeprazole 20 MG capsule  Commonly known as: priLOSEC   20 mg, Oral, Daily      oxyCODONE 10 MG tablet  Commonly known as: ROXICODONE   10 mg, Oral, Every 6 Hours PRN      polyethylene glycol 17 g packet  Commonly known as: MIRALAX   17 g, Oral, Daily PRN              Kieran Reddy MD  01/06/23  10:01 EST    Time: I spent 35 minutes on this discharge activity which included: face-to-face encounter with the patient, reviewing the data in the system, coordination of the care with the nursing staff as well as consultants, documentation, and entering orders.        Electronically  signed by Kieran Reddy MD at 01/06/23 1002

## 2023-01-10 ENCOUNTER — READMISSION MANAGEMENT (OUTPATIENT)
Dept: CALL CENTER | Facility: HOSPITAL | Age: 78
End: 2023-01-10
Payer: MEDICARE

## 2023-01-10 NOTE — OUTREACH NOTE
AMI Week 1 Survey    Flowsheet Row Responses   [facility] patient discharged from? [place]   Does the patient have one of the following disease processes/diagnoses(primary or secondary)? Acute MI (STEMI,NSTEMI)   Week 1 attempt successful? Yes   Call start time 1200   Call end time 1204   Discharge diagnosis STEMI   Is patient permission given to speak with other caregiver? Yes   List who call center can speak with dtr- Ritika   Person spoke with today (if not patient) and relationship Ritika and pt   Medication alerts for this patient dtr organized meds.   Meds reviewed with patient/caregiver? Yes   Is the patient having any side effects they believe may be caused by any medication additions or changes? No   Does the patient have all prescriptions related to this admission filled (includes statins,anticoagulants,HTN meds,anti-arrhythmia meds) Yes   Is the patient taking all medications as directed (includes completed medication regime)? Yes   Does the patient have a primary care provider?  Yes   Does the patient have an appointment with their PCP,cardiologist,or clinic within 7 days of discharge? Yes   Has the patient kept scheduled appointments due by today? N/A   What is the Home health agency?  [facility]   Home health comments pt wants to do inpt rehab, dtr is working withMSW to arrange.   DME comments wearing home O2 @ 2L- pulse ox 90's   Psychosocial issues? No   Comments [facility] site- right groin is bruised but no pain/edema. Pt remains weak   Did the patient receive a copy of their discharge instructions? Yes   Nursing interventions Reviewed instructions with patient   What is the patient's perception of their health status since discharge? Improving   Nursing interventions Nurse provided patient education   Is the patient/caregiver able to teach back signs and symptoms of when to call for help immediately: Sudden chest discomfort   Nursing interventions Nurse provided patient education   Is the  patient/caregiver able to teach back lifestyle changes to help prevent MIs Regular exercise as approved by provider, Heart healthy diet   Is the patient/caregiver able to teach back ways to prevent a second heart attack: Take medications   If the patient is a current smoker, are they able to teach back resources for cessation? Smoking cessation medications  [has not smoked x 2mo, using patches]   Week 1 call completed? Yes   Revoked No further contact(revokes)-requires comment   Is the patient interested in additional calls from an ambulatory ?  NOTE:  applies to high risk patients requiring additional follow-up. Courtney WILSON - Registered Nurse

## 2023-01-24 ENCOUNTER — OFFICE VISIT (OUTPATIENT)
Dept: CARDIOLOGY | Facility: HOSPITAL | Age: 78
End: 2023-01-24
Payer: MEDICARE

## 2023-01-24 ENCOUNTER — LAB (OUTPATIENT)
Dept: LAB | Facility: HOSPITAL | Age: 78
End: 2023-01-24
Payer: MEDICARE

## 2023-01-24 VITALS
TEMPERATURE: 96.5 F | RESPIRATION RATE: 20 BRPM | HEART RATE: 83 BPM | DIASTOLIC BLOOD PRESSURE: 62 MMHG | BODY MASS INDEX: 23.03 KG/M2 | HEIGHT: 65 IN | WEIGHT: 138.25 LBS | SYSTOLIC BLOOD PRESSURE: 121 MMHG | OXYGEN SATURATION: 94 %

## 2023-01-24 DIAGNOSIS — I25.2 HISTORY OF MI (MYOCARDIAL INFARCTION): ICD-10-CM

## 2023-01-24 DIAGNOSIS — R79.89 ELEVATED LFTS: ICD-10-CM

## 2023-01-24 DIAGNOSIS — I95.9 HYPOTENSION, UNSPECIFIED HYPOTENSION TYPE: ICD-10-CM

## 2023-01-24 DIAGNOSIS — I25.10 CORONARY ARTERY DISEASE INVOLVING NATIVE CORONARY ARTERY OF NATIVE HEART WITHOUT ANGINA PECTORIS: Primary | ICD-10-CM

## 2023-01-24 DIAGNOSIS — R41.3 MEMORY IMPAIRMENT: ICD-10-CM

## 2023-01-24 DIAGNOSIS — I44.2 COMPLETE HEART BLOCK: ICD-10-CM

## 2023-01-24 DIAGNOSIS — I49.01 VF (VENTRICULAR FIBRILLATION): ICD-10-CM

## 2023-01-24 DIAGNOSIS — N17.9 AKI (ACUTE KIDNEY INJURY): ICD-10-CM

## 2023-01-24 PROCEDURE — 80053 COMPREHEN METABOLIC PANEL: CPT

## 2023-01-24 PROCEDURE — 36415 COLL VENOUS BLD VENIPUNCTURE: CPT

## 2023-01-24 PROCEDURE — 99214 OFFICE O/P EST MOD 30 MIN: CPT | Performed by: NURSE PRACTITIONER

## 2023-01-24 RX ORDER — PRASUGREL 5 MG/1
5 TABLET, FILM COATED ORAL DAILY
Qty: 90 TABLET | Refills: 1 | Status: SHIPPED | OUTPATIENT
Start: 2023-01-24

## 2023-01-24 RX ORDER — MIDODRINE HYDROCHLORIDE 10 MG/1
10 TABLET ORAL
Qty: 270 TABLET | Refills: 1 | Status: SHIPPED | OUTPATIENT
Start: 2023-01-24

## 2023-01-24 NOTE — PROGRESS NOTES
"[facility], [facility] Heart and Vascular    Chief Complaint  Hospital Follow Up Visit (For CAD s/p STEMI and stent placement. )    Subjective    History of Present Illness {  Problem List  Visit  Diagnosis   Encounters  Notes  Medications  Labs  Result Review Imaging  Media :23}     Tucker Ambrosio presents to [facility] CARDIOLOGY for   History of Present Illness     77-year-old male presented to [facility] on 12/30/2022 with STEMI, PEA arrest in the field.    Left heart catheterization with drug-eluting stent to the RCA.  Patient required temporary pacing due to complete heart block and ventricular fibrillation status post defibrillation.  Struggled with hypotension.    Discharged home on midodrine.    Patient denies chest pain, pressure, dyspnea, dizziness, near-syncope, syncope, palpitations.  Daughter is present today and states that patient has had more memory impairment and at times confusion since discharge.  Did not tolerate skilled nursing facility rehab and is now home.  Home health seeing patient.  Plan to schedule cardiac rehab in 2 weeks.    Objective     Vital Signs:   Vitals:    01/24/23 1501 01/24/23 1502 01/24/23 1505   BP: 106/57 114/58 121/62   BP Location: Right arm Left arm Left arm   Patient Position: Sitting Standing Sitting   Cuff Size: Adult Adult Adult   Pulse: 83 84 83   Resp:   20   Temp:   96.5 °F (35.8 °C)   TempSrc:   Temporal   SpO2: 94% 93% 94%   Weight:   62.7 kg (138 lb 4 oz)   Height:   165.1 cm (65\")     Body mass index is 23.01 kg/m².  Physical Exam  Vitals reviewed.   Constitutional:       General: He is not in acute distress.     Appearance: Normal appearance.   Cardiovascular:      Rate and Rhythm: Normal rate and regular rhythm.      Pulses:           Radial pulses are 2+ on the right side and 2+ on the left side.        Dorsalis pedis pulses are 2+ on the right side and 2+ on the left side.        " Posterior tibial pulses are 2+ on the right side and 2+ on the left side.      Heart sounds: Normal heart sounds.   Pulmonary:      Effort: Pulmonary effort is normal.      Breath sounds: Normal breath sounds.   Musculoskeletal:      Right lower leg: No edema.      Left lower leg: No edema.   Skin:     General: Skin is warm and dry.   Neurological:      Mental Status: He is alert. He is disoriented.      Comments: Poor historian.     Psychiatric:         Mood and Affect: Mood normal.         Behavior: Behavior is cooperative.              Result Review  Data Reviewed:{ Labs  Result Review  Imaging  Med Tab  Media :23}   Echocardiogram 1/5/2023: EF 46 to 50%, no significant valvular disease    EKG 1/9/2023: Normal sinus rhythm 76 bpm    Echocardiogram 12/30/2022: EF 31 to 35%, mild MR, mild TR    Left heart catheterization 12/30/2022: Drug-eluting stent to RCA    Lab Results   Component Value Date    WBC 10.64 01/05/2023    HGB 10.4 (L) 01/05/2023    HCT 32.5 (L) 01/05/2023    MCV 98.8 (H) 01/05/2023     01/05/2023     Lab Results   Component Value Date    GLUCOSE 104 (H) 01/05/2023    BUN 38 (H) 01/05/2023    CREATININE 1.39 (H) 01/05/2023    BCR 27.3 (H) 01/05/2023    K 4.9 01/05/2023    CO2 22.0 01/05/2023    CALCIUM 8.2 (L) 01/05/2023    ALBUMIN 2.6 (L) 01/01/2023     (H) 12/30/2022    ALT 51 (H) 12/30/2022     Lab Results   Component Value Date    TSH 1.400 12/30/2022     Lab Results   Component Value Date    CHOL 134 12/31/2022     Lab Results   Component Value Date    TRIG 68 12/31/2022     Lab Results   Component Value Date    HDL 36 (L) 12/31/2022     Lab Results   Component Value Date    LDL 84 12/31/2022                   Assessment and Plan {CC Problem List  Visit Diagnosis  ROS  Review (Popup)  Health Maintenance  Quality  BestPractice  Medications  SmartSets  SnapShot Encounters  Media :23}   1. Coronary artery disease involving native coronary artery of native heart  without angina pectoris  Status post STEMI with stent placement  Aspirin, statin, Effient  - prasugrel (EFFIENT) 5 MG tablet; Take 1 tablet by mouth Daily.  Dispense: 90 tablet; Refill: 1    2. History of MI (myocardial infarction)  EF initially impaired but improved.    3. Complete heart block (HCC)  Required temporary pacing initially.  No dizziness, near-syncope, syncope, falls    4. VF (ventricular fibrillation) (HCC)      5. Hypotension, unspecified hypotension type  Home blood pressure 90s to low 100s.  Continue midodrine  - midodrine (PROAMATINE) 10 MG tablet; Take 1 tablet by mouth 3 (Three) Times a Day Before Meals.  Dispense: 270 tablet; Refill: 1    6. RICKIE (acute kidney injury) ([facility])    - Comprehensive Metabolic Panel; Future    7. Elevated LFTs    - Comprehensive Metabolic Panel; Future    8. Memory impairment  Noted since PEA, standing.  Will check CT of the head with and without contrast.    - CT Head With & Without Contrast; Future    Patient will follow up with [facility] as scheduled.  Follow-up in the heart valve center as needed or as determined by cardiology      Follow Up {Instructions Charge Capture  Follow-up Communications :23}   Return if symptoms worsen or fail to improve.    Patient was given instructions and counseling regarding his condition or for health maintenance advice. Please see specific information pulled into the AVS if appropriate.  Patient was instructed to call the Heart and Valve Center with any questions, concerns, or worsening symptoms.

## 2023-01-25 ENCOUNTER — TELEPHONE (OUTPATIENT)
Dept: CARDIOLOGY | Facility: HOSPITAL | Age: 78
End: 2023-01-25
Payer: MEDICARE

## 2023-01-25 DIAGNOSIS — E87.5 HYPERKALEMIA: ICD-10-CM

## 2023-01-25 DIAGNOSIS — R79.89 ELEVATED LFTS: ICD-10-CM

## 2023-01-25 DIAGNOSIS — N17.9 AKI (ACUTE KIDNEY INJURY): Primary | ICD-10-CM

## 2023-01-25 LAB
ALBUMIN SERPL-MCNC: 4.1 G/DL (ref 3.5–5.2)
ALBUMIN/GLOB SERPL: 1.3 G/DL
ALP SERPL-CCNC: 149 U/L (ref 39–117)
ALT SERPL W P-5'-P-CCNC: 29 U/L (ref 1–41)
ANION GAP SERPL CALCULATED.3IONS-SCNC: 10 MMOL/L (ref 5–15)
AST SERPL-CCNC: 44 U/L (ref 1–40)
BILIRUB SERPL-MCNC: 0.4 MG/DL (ref 0–1.2)
BUN SERPL-MCNC: 25 MG/DL (ref 8–23)
BUN/CREAT SERPL: 18.7 (ref 7–25)
CALCIUM SPEC-SCNC: 9.4 MG/DL (ref 8.6–10.5)
CHLORIDE SERPL-SCNC: 103 MMOL/L (ref 98–107)
CO2 SERPL-SCNC: 26 MMOL/L (ref 22–29)
CREAT SERPL-MCNC: 1.34 MG/DL (ref 0.76–1.27)
EGFRCR SERPLBLD CKD-EPI 2021: 54.9 ML/MIN/1.73
GLOBULIN UR ELPH-MCNC: 3.1 GM/DL
GLUCOSE SERPL-MCNC: 98 MG/DL (ref 65–99)
POTASSIUM SERPL-SCNC: 5.5 MMOL/L (ref 3.5–5.2)
PROT SERPL-MCNC: 7.2 G/DL (ref 6–8.5)
SODIUM SERPL-SCNC: 139 MMOL/L (ref 136–145)

## 2023-01-25 NOTE — TELEPHONE ENCOUNTER
Called and spoke to patients daughter and reviewed lab results as requested.     Creatinine is stable.  Not back to baseline, but has not worsened.  Sodium normal liver enzymes improving.  Potassium 5.5.  Previously 4.9.  Confirm that patient is not taking any potassium supplements.    We will repeat lab work at follow-up visit with [facility].  Patient states understanding.

## 2023-02-03 ENCOUNTER — TELEPHONE (OUTPATIENT)
Dept: CARDIOLOGY | Facility: HOSPITAL | Age: 78
End: 2023-02-03
Payer: MEDICARE

## 2023-02-03 NOTE — TELEPHONE ENCOUNTER
Received CT of the head with and without contrast completed on 1/31/2023 at [facility]    No acute intracranial findings.  Volume loss with sequela of chronic small vessel changes, right mastoid air cells and middle ear effusion.  Correlate for possibility of ostial mastoiditis.

## 2023-02-03 NOTE — TELEPHONE ENCOUNTER
Spoke to daughter and she verbalized understanding on findings and had no further questions at this time.

## 2023-02-03 NOTE — TELEPHONE ENCOUNTER
Please call patient.  I received a CT of the head with and without contrast.    No concerning new findings.  He does have fluid noted on his right ear.  He may want to see his primary care provider if he has continued dizziness, poor balance, or fullness in his ear.  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  This is not a new finding.  His current treatment is appropriate.

## 2023-03-06 ENCOUNTER — OFFICE VISIT (OUTPATIENT)
Dept: CARDIOLOGY | Facility: CLINIC | Age: 78
End: 2023-03-06
Payer: MEDICARE

## 2023-03-06 VITALS
BODY MASS INDEX: 22.82 KG/M2 | HEIGHT: 65 IN | DIASTOLIC BLOOD PRESSURE: 60 MMHG | WEIGHT: 137 LBS | SYSTOLIC BLOOD PRESSURE: 120 MMHG | HEART RATE: 61 BPM | RESPIRATION RATE: 20 BRPM | OXYGEN SATURATION: 95 %

## 2023-03-06 DIAGNOSIS — E78.2 MIXED HYPERLIPIDEMIA: ICD-10-CM

## 2023-03-06 DIAGNOSIS — E87.5 HYPERKALEMIA: ICD-10-CM

## 2023-03-06 DIAGNOSIS — I25.118 CORONARY ARTERY DISEASE OF NATIVE ARTERY OF NATIVE HEART WITH STABLE ANGINA PECTORIS: Primary | ICD-10-CM

## 2023-03-06 DIAGNOSIS — I49.01 VF (VENTRICULAR FIBRILLATION): ICD-10-CM

## 2023-03-06 PROCEDURE — 99214 OFFICE O/P EST MOD 30 MIN: CPT | Performed by: INTERNAL MEDICINE

## 2023-03-06 RX ORDER — NITROGLYCERIN 0.4 MG/1
TABLET SUBLINGUAL
Qty: 100 TABLET | Refills: 11 | Status: SHIPPED | OUTPATIENT
Start: 2023-03-06

## 2023-03-06 NOTE — PROGRESS NOTES
[facility] Cardiology   [address], [address]  [place], [place], [address]    [phone]  WW[facility]           OUTPATIENT CLINIC FOLLOW UP NOTE    Patient Care Team:  Patient Care Team:  Avery Sheldon MD as PCP - General (Family Medicine)  Kieran Reddy MD as Consulting Physician (Cardiology)    Subjective:      Chief Complaint   Patient presents with   • Hospital Follow Up Visit     STEMI       HPI:    Tucker Ambrosio is a 77 y.o. male.  Cardiac focused, problem list:  1. Inferior STEMI, CAD  a. status post possible PEA arrest in the field, ROSC in 3 minutes  b. Inferior STEMI with LHC, 12/30/2022: LOGAN to 100% RCA thrombotic occlusion. Cath complicated by intraprocedural Vfib with defibrillation x 1 and conversion to NSR.   2. Complete heart block  1. Status post temporary pacemaker wire during cath.   3. Systolic heart failure  1. Echocardiogram 12/30/2022:  LVEF 31-35%. Hypokinetic LV wall segments including apical lateral, basal inferolateral, mid inferolateral, apical inferior, mid inferior and basal inferior. Mildly dilated RV. Mild TR. RVSP 35-45 mmHg.   2. Echocardiogram 1/05/2023:  EF 46-50%. Normal RV cavity size and systolic function noted.   4. Recent admission at OSH for flu, acute respiratory failure  5. GERD  6. Chronic back pain  7. Skin cancer  8. Former tobacco dependence, 1 pack/day for 60 years, quit 11/2022, using nicotine patches  9. COVID-pneumonia 2/10/2023  10. Surgical history: Appendectomy    Patient presents today for follow up.     Since his last visit with the heart and valve clinic the patient has developed COVID-pneumonia.  Ongoing active treatment.  Repeat x-ray and pulmonology visit upcoming.  On doxycycline    Mild fleeting chest pains without significant anginal-like qualities.    Not taking all of his medicines consistently.    Review of Systems:  As noted above in the HPI     PFSH:  Patient Active Problem List   Diagnosis   • PEA  arrest ([facility])   • I21.19, STEMI S/P LOGAN RCA 12/30/22   • Acute systolic heart failure ([facility])   • Intraprocedural VF (ventricular fibrillation) ([facility])   • Intraprocedural Complete heart block ([facility])   • STEMI (ST elevation myocardial infarction) ([facility])   • Tobacco use   • COPD (chronic obstructive pulmonary disease) ([facility])   • GERD (gastroesophageal reflux disease)   • Chronic hypoxemic respiratory failure ([facility])   • Prediabetes   • RICKIE (acute kidney injury) on probable CKD   • Mixed hyperlipidemia   • Coronary artery disease of native artery of native heart with stable angina pectoris ([facility])         Current Outpatient Medications:   •  albuterol sulfate  (90 Base) MCG/ACT inhaler, Inhale 2 puffs Every 6 (Six) Hours As Needed for Wheezing., Disp: , Rfl:   •  aspirin 81 MG EC tablet, Take 1 tablet by mouth Daily., Disp: 90 tablet, Rfl: 3  •  gabapentin (NEURONTIN) 100 MG capsule, Take 2 capsules by mouth 2 (Two) Times a Day., Disp: , Rfl:   •  GUAIFENESIN ER PO, Take 600 mg by mouth 2 (Two) Times a Day., Disp: , Rfl:   •  ipratropium-albuterol (DUO-NEB) 0.5-2.5 mg/3 ml nebulizer, Take 3 mL by nebulization Every 4 (Four) Hours As Needed for Wheezing., Disp: , Rfl:   •  midodrine (PROAMATINE) 10 MG tablet, Take 1 tablet by mouth 3 (Three) Times a Day Before Meals., Disp: 270 tablet, Rfl: 1  •  mometasone-formoterol (DULERA 200) 200-5 MCG/ACT inhaler, Inhale 2 puffs 2 (Two) Times a Day., Disp: , Rfl:   •  nicotine (NICODERM CQ) 21 MG/24HR patch, Place 1 patch on the skin as directed by provider Daily., Disp: , Rfl:   •  omeprazole (priLOSEC) 20 MG capsule, Take 1 capsule by mouth Daily., Disp: , Rfl:   •  oxyCODONE (ROXICODONE) 10 MG tablet, Take 1 tablet by mouth Every 6 (Six) Hours As Needed for Moderate Pain., Disp: , Rfl:   •  polyethylene glycol (MIRALAX) 17 g packet, Take 17 g by mouth Daily As Needed., Disp: , Rfl:   •  prasugrel (EFFIENT) 5 MG tablet, Take 1 tablet by mouth Daily., Disp: 90 tablet, Rfl: 1  •   "rosuvastatin (CRESTOR) 20 MG tablet, Take 1 tablet by mouth Every Night., Disp: 90 tablet, Rfl: 3  •  nitroglycerin (NITROSTAT) 0.4 MG SL tablet, 1 under the tongue as needed for angina, may repeat q5mins for up three doses, Disp: 100 tablet, Rfl: 11     reports that he quit smoking about 4 months ago. His smoking use included cigarettes. He smoked an average of 2 packs per day. He has never used smokeless tobacco.      Objective:   Physical exam:  /60 (BP Location: Right arm, Patient Position: Sitting, Cuff Size: Adult)   Pulse 61   Resp 20   Ht 165.1 cm (65\")   Wt 62.1 kg (137 lb)   SpO2 95%   BMI 22.80 kg/m²   CONSTITUTIONAL: No acute distress  CARDIOVASCULAR: Regular rate and rhythm with normal S1 and S2. Without murmur.  PERIPHERAL VASCULAR: No carotid bruit bilaterally.  Normal radial pulse. There is no lower extremity edema bilaterally.      Labs:    Lab Results   Component Value Date    LDL 84 12/31/2022     No components found for: LDLDIRECTC    Diagnostic Data:    Procedures    C 12/30/2022  •  There was a 100% thrombotic distal RCA stenosis that is status post intervention with a Xience Skypoint 3.5 x 38 mm drug-eluting stent.  •  The left ventricular ejection fraction is estimated at 40%.  There is hypokinesis of the basal to mid inferior segments.  The LVEDP is elevated at 22 mmHg.  •  The patient had complete heart block intra procedurally, that treated with a temporary pacemaker wire.  •  The patient had ventricular fibrillation intra procedurally that is status post defibrillation.     TTE 1/05/2023  •  Left ventricular systolic function is low normal. Left ventricular ejection fraction appears to be 46 - 50%.  •  Normal right ventricular cavity size and systolic function noted.    TTE OSH 2/9/2023: EF 50%, mild LVH    CT Head 1/31/2023 at [facility]   No acute intracranial findings.  Volume loss with sequela of chronic small vessel changes, right mastoid air cells and " middle ear effusion.  Correlate for possibility of ostial mastoiditis.    Assessment and Plan:     Coronary artery disease  History of VFib   Mixed hyperlipidemia  -Current shortness of air and hypoxia appears related to recent COVID-pneumonia  -Continue current cardiac medications.  Adding sublingual nitro as needed  -Congratulated patient on abstaining from nicotine products  -Encouraged medical compliance    Hyperkalemia   -Normalized potassium on OSH labs    - Return in about 6 months (around 9/6/2023) for Next scheduled follow-up with an ECG.    Kieran Reddy MD, MSc, FACC, [facility]  Interventional Cardiology  [facility]

## 2023-10-25 ENCOUNTER — OFFICE VISIT (OUTPATIENT)
Dept: CARDIOLOGY | Facility: CLINIC | Age: 78
End: 2023-10-25
Payer: MEDICARE

## 2023-10-25 VITALS
WEIGHT: 140 LBS | HEIGHT: 65 IN | OXYGEN SATURATION: 94 % | SYSTOLIC BLOOD PRESSURE: 122 MMHG | BODY MASS INDEX: 23.32 KG/M2 | DIASTOLIC BLOOD PRESSURE: 66 MMHG | HEART RATE: 92 BPM

## 2023-10-25 DIAGNOSIS — I95.9 HYPOTENSION, UNSPECIFIED HYPOTENSION TYPE: ICD-10-CM

## 2023-10-25 DIAGNOSIS — I25.10 CORONARY ARTERY DISEASE INVOLVING NATIVE CORONARY ARTERY OF NATIVE HEART WITHOUT ANGINA PECTORIS: ICD-10-CM

## 2023-10-25 DIAGNOSIS — I50.21 ACUTE SYSTOLIC HEART FAILURE: ICD-10-CM

## 2023-10-25 DIAGNOSIS — E78.2 MIXED HYPERLIPIDEMIA: ICD-10-CM

## 2023-10-25 DIAGNOSIS — I25.118 CORONARY ARTERY DISEASE OF NATIVE ARTERY OF NATIVE HEART WITH STABLE ANGINA PECTORIS: Primary | ICD-10-CM

## 2023-10-25 RX ORDER — NITROGLYCERIN 0.4 MG/1
TABLET SUBLINGUAL
Qty: 30 TABLET | Refills: 11 | Status: SHIPPED | OUTPATIENT
Start: 2023-10-25

## 2023-10-25 RX ORDER — PRASUGREL 5 MG/1
5 TABLET, FILM COATED ORAL DAILY
Qty: 90 TABLET | Refills: 3 | Status: SHIPPED | OUTPATIENT
Start: 2023-10-25

## 2023-10-25 RX ORDER — MIDODRINE HYDROCHLORIDE 10 MG/1
10 TABLET ORAL
Qty: 270 TABLET | Refills: 3 | Status: SHIPPED | OUTPATIENT
Start: 2023-10-25

## 2023-10-25 RX ORDER — ASPIRIN 81 MG/1
81 TABLET ORAL DAILY
Qty: 90 TABLET | Refills: 3 | Status: SHIPPED | OUTPATIENT
Start: 2023-10-25

## 2023-10-25 RX ORDER — ROSUVASTATIN CALCIUM 20 MG/1
20 TABLET, COATED ORAL NIGHTLY
Qty: 90 TABLET | Refills: 3 | Status: SHIPPED | OUTPATIENT
Start: 2023-10-25

## 2023-10-25 RX ORDER — OXYCODONE AND ACETAMINOPHEN 10; 325 MG/1; MG/1
1 TABLET ORAL 3 TIMES DAILY
COMMUNITY
Start: 2023-09-28

## 2023-10-25 NOTE — PROGRESS NOTES
[facility] Cardiology   [address], [address]  [place], [place], [address]    [phone]  [facility]           OUTPATIENT CLINIC FOLLOW UP NOTE    Patient Care Team:  Patient Care Team:  Avery Sheldon MD as PCP - General (Family Medicine)  Kieran Reddy MD as Consulting Physician (Cardiology)    Subjective:      Chief Complaint   Patient presents with    Coronary Artery Disease    Chest Pain    Shortness of Breath    Dizziness              HPI:    Tucekr Ambrosio is a 77 y.o. male.  Cardiac focused, problem list:  Inferior STEMI, CAD  status post possible PEA arrest in the field, ROSC in 3 minutes  Inferior STEMI with LHC, 12/30/2022: LOGAN to 100% RCA thrombotic occlusion. Cath complicated by intraprocedural Vfib with defibrillation x 1 and conversion to NSR.   Complete heart block  Status post temporary pacemaker wire during cath.   Systolic heart failure  Echocardiogram 12/30/2022:  LVEF 31-35%. Hypokinetic LV wall segments including apical lateral, basal inferolateral, mid inferolateral, apical inferior, mid inferior and basal inferior. Mildly dilated RV. Mild TR. RVSP 35-45 mmHg.   Echocardiogram 1/05/2023:  EF 46-50%. Normal RV cavity size and systolic function noted.   Recent admission at OSH for flu, acute respiratory failure  GERD  Chronic back pain  Skin cancer  Former tobacco dependence, 1 pack/day for 60 years, quit 11/2022, using nicotine patches  COVID-pneumonia 2/10/2023  Surgical history: Appendectomy    Patient presents today for follow up.     He has been doing well from a cardiac standpoint since last visit.  Has occasional, mild fleeting chest pain.  Has not had to take sublingual nitroglycerin. Chronic shortness of breath with COPD.  Following with pulmonology in [place].  Blood pressure stable on midodrine.  Denies exertional chest pain, palpitations, significant lower extremity edema, lightheadedness or syncope.    Review of Systems:  As  noted above in the HPI     PFSH:  Patient Active Problem List   Diagnosis    Cardiac arrest    I21.19, STEMI S/P LOGAN RCA 12/30/22    Acute systolic heart failure    VF (ventricular fibrillation)    Complete heart block    STEMI (ST elevation myocardial infarction)    Tobacco use    COPD (chronic obstructive pulmonary disease)    GERD (gastroesophageal reflux disease)    Chronic hypoxemic respiratory failure    Prediabetes    RICKIE (acute kidney injury) on probable CKD    Mixed hyperlipidemia    Coronary artery disease of native artery of native heart with stable angina pectoris         Current Outpatient Medications:     albuterol sulfate  (90 Base) MCG/ACT inhaler, Inhale 2 puffs Every 6 (Six) Hours As Needed for Wheezing., Disp: , Rfl:     aspirin 81 MG EC tablet, Take 1 tablet by mouth Daily., Disp: 90 tablet, Rfl: 3    gabapentin (NEURONTIN) 100 MG capsule, Take 2 capsules by mouth 2 (Two) Times a Day., Disp: , Rfl:     guaiFENesin (MUCINEX) 600 MG 12 hr tablet, Take 1 tablet by mouth 2 (Two) Times a Day., Disp: , Rfl:     ipratropium-albuterol (DUO-NEB) 0.5-2.5 mg/3 ml nebulizer, Take 3 mL by nebulization Every 4 (Four) Hours As Needed for Wheezing., Disp: , Rfl:     midodrine (PROAMATINE) 10 MG tablet, Take 1 tablet by mouth 3 (Three) Times a Day Before Meals., Disp: 270 tablet, Rfl: 1    mometasone-formoterol (DULERA 200) 200-5 MCG/ACT inhaler, Inhale 2 puffs 2 (Two) Times a Day., Disp: , Rfl:     nicotine (NICODERM CQ) 21 MG/24HR patch, Place 1 patch on the skin as directed by provider Daily., Disp: , Rfl:     nitroglycerin (NITROSTAT) 0.4 MG SL tablet, 1 under the tongue as needed for angina, may repeat q5mins for up three doses, Disp: 100 tablet, Rfl: 11    omeprazole (priLOSEC) 20 MG capsule, Take 1 capsule by mouth Daily., Disp: , Rfl:     oxyCODONE-acetaminophen (PERCOCET)  MG per tablet, Take 1 tablet by mouth 3 times a day., Disp: , Rfl:     polyethylene glycol (MIRALAX) 17 g packet, Take  "17 g by mouth Daily As Needed., Disp: , Rfl:     prasugrel (EFFIENT) 5 MG tablet, Take 1 tablet by mouth Daily., Disp: 90 tablet, Rfl: 1    rosuvastatin (CRESTOR) 20 MG tablet, Take 1 tablet by mouth Every Night., Disp: 90 tablet, Rfl: 3     reports that he quit smoking about a year ago. His smoking use included cigarettes. He smoked an average of 2 packs per day. He has never used smokeless tobacco.      Objective:   Physical exam:  /66 (BP Location: Right arm, Patient Position: Sitting)   Pulse 92   Ht 165.1 cm (65\")   Wt 63.5 kg (140 lb)   SpO2 94%   BMI 23.30 kg/m²   CONSTITUTIONAL: No acute distress  CARDIOVASCULAR: Regular rate and rhythm with normal S1 and S2. Without murmur.  PERIPHERAL VASCULAR: No carotid bruit bilaterally.  Normal radial pulse. There is no lower extremity edema bilaterally.      Labs:    Lab Results   Component Value Date    LDL 84 12/31/2022     No components found for: \"LDLDIRECTC\"    Diagnostic Data:    Procedures    C 12/30/2022    There was a 100% thrombotic distal RCA stenosis that is status post intervention with a Xience Skypoint 3.5 x 38 mm drug-eluting stent.    The left ventricular ejection fraction is estimated at 40%.  There is hypokinesis of the basal to mid inferior segments.  The LVEDP is elevated at 22 mmHg.    The patient had complete heart block intra procedurally, that treated with a temporary pacemaker wire.    The patient had ventricular fibrillation intra procedurally that is status post defibrillation.     TTE 1/05/2023    Left ventricular systolic function is low normal. Left ventricular ejection fraction appears to be 46 - 50%.    Normal right ventricular cavity size and systolic function noted.    TTE OSH 2/9/2023: EF 50%, mild LVH    CT Head 1/31/2023 at [facility]   No acute intracranial findings.  Volume loss with sequela of chronic small vessel changes, right mastoid air cells and middle ear effusion.  Correlate for possibility of " ostial mastoiditis.    Assessment and Plan:     Coronary artery disease  History of VFib   Mixed hyperlipidemia  -Occasional, mild fleeting chest pains. Continue sublingual nitro as needed.   -Continue current cardiac medications.   -Encouraged patient to continue efforts towards smoking cessation and increasing exercise.   -Encouraged medical compliance.  Refills sent in electronically today for cardiac medications.   -Yearly lipid panel with PCP.     Dyspnea  -Chronic dyspnea associated with COPD.   -Continue to follow up with pulmonology in [place].     - Return in about 1 year (around 10/25/2024) for Next scheduled follow up with EKG .    Electronically signed by CAPRI Loo, 10/25/23, 9:31 AM EDT.

## 2024-01-24 ENCOUNTER — TELEPHONE (OUTPATIENT)
Dept: CARDIOLOGY | Facility: CLINIC | Age: 79
End: 2024-01-24
Payer: MEDICARE

## 2024-01-24 NOTE — TELEPHONE ENCOUNTER
Patients daughter states that patient had blood work done that showed low platelets. Patient was advised to present to ER. Patient received platelet infusions and was discharged. CBC rechecked with PCP was improved. Patients daughter states that he is fatigued, bloated, and no appetite. He is feeling somewhat improved after transfusion.     Daughter advised to schedule follow up with Dr. Sheldon this week to have labs rechecked.     [facility] records requested.     Patients daughter was concerned about him being on his Effient. Please advise.

## 2024-01-24 NOTE — TELEPHONE ENCOUNTER
Caller: JERRELL CHAN    Relationship: Emergency Contact    Best call back number: [phone]     What is the best time to reach you: ANY     Who are you requesting to speak with (clinical staff, provider,  specific staff member): CLINICAL     What was the call regarding: PT'S DAUGHTER WAS CALLING TO  INQUIRE IF THE PT SHOULD STOP TAKING HIS BLOOD THINNER, DUE TO A RECENT VISIT TO THE DOCTOR. PT'S DAUGHTER IS WORRIED ABOUT IF THE PT WERE TO GET A CUT OR SOMETHING ALONG THOSE LINES AND NOT BE ABLE TO EASILY STOP BLEEDING.

## 2025-02-17 ENCOUNTER — OFFICE VISIT (OUTPATIENT)
Dept: CARDIOLOGY | Facility: CLINIC | Age: 80
End: 2025-02-17
Payer: MEDICARE

## 2025-02-17 VITALS
HEIGHT: 65 IN | HEART RATE: 98 BPM | OXYGEN SATURATION: 91 % | SYSTOLIC BLOOD PRESSURE: 122 MMHG | DIASTOLIC BLOOD PRESSURE: 74 MMHG | WEIGHT: 139.4 LBS | BODY MASS INDEX: 23.22 KG/M2

## 2025-02-17 DIAGNOSIS — E78.2 MIXED HYPERLIPIDEMIA: ICD-10-CM

## 2025-02-17 DIAGNOSIS — I50.21 ACUTE SYSTOLIC HEART FAILURE: ICD-10-CM

## 2025-02-17 DIAGNOSIS — I44.2 COMPLETE HEART BLOCK: ICD-10-CM

## 2025-02-17 DIAGNOSIS — I25.118 CORONARY ARTERY DISEASE OF NATIVE ARTERY OF NATIVE HEART WITH STABLE ANGINA PECTORIS: Primary | ICD-10-CM

## 2025-02-17 DIAGNOSIS — I46.9 CARDIAC ARREST: ICD-10-CM

## 2025-02-17 PROCEDURE — 1160F RVW MEDS BY RX/DR IN RCRD: CPT | Performed by: HOSPITALIST

## 2025-02-17 PROCEDURE — 1159F MED LIST DOCD IN RCRD: CPT | Performed by: HOSPITALIST

## 2025-02-17 PROCEDURE — 99214 OFFICE O/P EST MOD 30 MIN: CPT | Performed by: HOSPITALIST

## 2025-02-17 PROCEDURE — 93000 ELECTROCARDIOGRAM COMPLETE: CPT | Performed by: HOSPITALIST

## 2025-02-17 RX ORDER — OXYCODONE HYDROCHLORIDE 10 MG/1
10 TABLET ORAL 3 TIMES DAILY
COMMUNITY

## 2025-02-17 NOTE — PROGRESS NOTES
[facility] Cardiology   [address], [address]  [place], [place], [address]    [phone]  [facility]           OUTPATIENT CLINIC FOLLOW UP NOTE    Patient Care Team:  Patient Care Team:  Avery Sheldon MD as PCP - General (Family Medicine)  Kieran Reddy MD as Consulting Physician (Cardiology)    Subjective:      Chief Complaint   Patient presents with    Coronary artery disease of native artery of native heart wi       HPI:    Tucker Ambrosio is a 79 y.o. male.  Cardiac focused, problem list:  Inferior STEMI, CAD  status post possible PEA arrest in the field, ROSC in 3 minutes  Inferior STEMI with LHC, 12/30/2022: LOGAN to 100% RCA thrombotic occlusion. Cath complicated by intraprocedural Vfib with defibrillation x 1 and conversion to NSR.   Complete heart block  Status post temporary pacemaker wire during cath.   Systolic heart failure  Echocardiogram 12/30/2022:  LVEF 31-35%. Hypokinetic LV wall segments including apical lateral, basal inferolateral, mid inferolateral, apical inferior, mid inferior and basal inferior. Mildly dilated RV. Mild TR. RVSP 35-45 mmHg.   Echocardiogram 1/05/2023:  EF 46-50%. Normal RV cavity size and systolic function noted.   Recent admission at OSH for flu, acute respiratory failure  GERD  Chronic back pain  Skin cancer  Former tobacco dependence, 1 pack/day for 60 years, quit 11/2022, using nicotine patches  COVID-pneumonia 2/10/2023  Surgical history: Appendectomy    Patient presents today for follow up.     Doing well from a cardiac standpoint.  Mild, infrequent fleeting chest pain.  Has chronic dyspnea that is stable.  Follows with pulmonary in [place].  Patient recently noted to have critically low platelets 1/23/2025 and was evaluated in the ED at [facility].  Repeat labs since ED evaluation not available for review.  Patient has follow up with PCP regarding low platelets tomorrow.     Review of Systems:  As noted  above in the HPI     PFSH:  Patient Active Problem List   Diagnosis    Cardiac arrest    I21.19, STEMI S/P LOGAN RCA 12/30/22    Acute systolic heart failure    VF (ventricular fibrillation)    Complete heart block    STEMI (ST elevation myocardial infarction)    Tobacco use    COPD (chronic obstructive pulmonary disease)    GERD (gastroesophageal reflux disease)    Chronic hypoxemic respiratory failure    Prediabetes    RICKIE (acute kidney injury) on probable CKD    Mixed hyperlipidemia    Coronary artery disease of native artery of native heart with stable angina pectoris         Current Outpatient Medications:     albuterol sulfate  (90 Base) MCG/ACT inhaler, Inhale 2 puffs Every 6 (Six) Hours As Needed for Wheezing., Disp: , Rfl:     aspirin 81 MG EC tablet, Take 1 tablet by mouth Daily., Disp: 90 tablet, Rfl: 3    gabapentin (NEURONTIN) 100 MG capsule, Take 2 capsules by mouth 2 (Two) Times a Day., Disp: , Rfl:     guaiFENesin (MUCINEX) 600 MG 12 hr tablet, Take 1 tablet by mouth 2 (Two) Times a Day., Disp: , Rfl:     ipratropium-albuterol (DUO-NEB) 0.5-2.5 mg/3 ml nebulizer, Take 3 mL by nebulization Every 4 (Four) Hours As Needed for Wheezing., Disp: , Rfl:     midodrine (PROAMATINE) 10 MG tablet, Take 1 tablet by mouth 3 (Three) Times a Day Before Meals., Disp: 270 tablet, Rfl: 3    mometasone-formoterol (DULERA 200) 200-5 MCG/ACT inhaler, Inhale 2 puffs 2 (Two) Times a Day., Disp: , Rfl:     nicotine (NICODERM CQ) 21 MG/24HR patch, Place 1 patch on the skin as directed by provider Daily., Disp: , Rfl:     nitroglycerin (NITROSTAT) 0.4 MG SL tablet, 1 under the tongue as needed for angina, may repeat q5mins for up three doses, Disp: 30 tablet, Rfl: 11    omeprazole (priLOSEC) 20 MG capsule, Take 1 capsule by mouth Daily., Disp: , Rfl:     oxyCODONE (ROXICODONE) 10 MG tablet, Take 1 tablet by mouth 3 (Three) Times a Day., Disp: , Rfl:     oxyCODONE-acetaminophen (PERCOCET)  MG per tablet, Take 1  "tablet by mouth 3 times a day., Disp: , Rfl:     polyethylene glycol (MIRALAX) 17 g packet, Take 17 g by mouth Daily As Needed., Disp: , Rfl:     rosuvastatin (CRESTOR) 20 MG tablet, Take 1 tablet by mouth Every Night., Disp: 90 tablet, Rfl: 3     reports that he has been smoking cigarettes. He started smoking about 2 months ago. He has a 0.2 pack-year smoking history. He has never used smokeless tobacco.      Objective:   Physical exam:  /74 (BP Location: Left arm, Patient Position: Sitting, Cuff Size: Adult)   Pulse 98   Ht 165.1 cm (65\")   Wt 63.2 kg (139 lb 6.4 oz)   SpO2 91%   BMI 23.20 kg/m²   CONSTITUTIONAL: No acute distress  CARDIOVASCULAR: Regular rate and rhythm with normal S1 and S2. Without murmur.  PERIPHERAL VASCULAR: No carotid bruit bilaterally.  Normal radial pulse. There is no lower extremity edema bilaterally.      Labs:    Lab Results   Component Value Date    LDL 84 12/31/2022     No components found for: \"LDLDIRECTC\"    Diagnostic Data:      ECG 12 Lead    Date/Time: 2/17/2025 11:37 AM  Performed by: Jo Gunn APRN    Authorized by: Jo Gunn APRN  Comparison: compared with previous ECG from 1/3/2023  Similar to previous ECG  Rhythm: sinus rhythm  Rate: normal  BPM: 98  Conduction: conduction normal          [facility] 12/30/2022    There was a 100% thrombotic distal RCA stenosis that is status post intervention with a Xience Skypoint 3.5 x 38 mm drug-eluting stent.    The left ventricular ejection fraction is estimated at 40%.  There is hypokinesis of the basal to mid inferior segments.  The LVEDP is elevated at 22 mmHg.    The patient had complete heart block intra procedurally, that treated with a temporary pacemaker wire.    The patient had ventricular fibrillation intra procedurally that is status post defibrillation.     TTE 1/05/2023    Left ventricular systolic function is low normal. Left ventricular ejection fraction appears to be 46 - 50%.    Normal right " ventricular cavity size and systolic function noted.    TTE OSH 2/9/2023: EF 50%, mild LVH    CT Head 1/31/2023 at [facility]   No acute intracranial findings.  Volume loss with sequela of chronic small vessel changes, right mastoid air cells and middle ear effusion.  Correlate for possibility of ostial mastoiditis.    Assessment and Plan:     Coronary artery disease  History of VFib   Mixed hyperlipidemia  -Occasional, mild fleeting chest pains. Continue sublingual nitro as needed.   -Continue current cardiac medications.   -Encouraged patient to continue efforts towards smoking cessation and increasing exercise.   -Encouraged medical compliance.  Refills sent in electronically today for cardiac medications.   -Recent critically low platelet count noted at PCP office 1/23/2024.  Patient evaluated in the ED at [facility].  Repeat labs not available for review.    -Labs today including direct LDL, CBC, CMP   -Has follow up with PCP tomorrow regarding low platelets.  No obvious signs of bleeding currently.     Dyspnea  -Chronic dyspnea associated with COPD.   -Continue to follow up with pulmonology in [place].     - Return in about 1 year (around 2/17/2026) for Next scheduled follow up with Dr. Reddy, EKG .    Electronically signed by CAPRI Loo, 10/25/23, 9:31 AM EDT.

## (undated) DEVICE — GW PERIPH VASC ADX J/TP SS .035 150CM 3MM

## (undated) DEVICE — NC TREK NEO™ CORONARY DILATATION CATHETER 3.50 MM X 15 MM / RAPID-EXCHANGE: Brand: NC TREK NEO™

## (undated) DEVICE — PINNACLE INTRODUCER SHEATH: Brand: PINNACLE

## (undated) DEVICE — Device: Brand: ASAHI SION BLUE

## (undated) DEVICE — CATH DIAG EXPO M/ PK 6FR FL4/FR4 PIG 3PK

## (undated) DEVICE — GUIDE CATHETER: Brand: MACH1™

## (undated) DEVICE — TREK CORONARY DILATATION CATHETER 2.50 MM X 12 MM / RAPID-EXCHANGE: Brand: TREK

## (undated) DEVICE — CVR PROB ULTRASND/TRANSD W/GEL 7X11IN STRL

## (undated) DEVICE — PK CATH CARD 10

## (undated) DEVICE — DEV INFL MONARCH 25W

## (undated) DEVICE — CATH INTRAVAS ULTRASND EAGLE EYE 2.9FR

## (undated) DEVICE — NC TREK NEO™ CORONARY DILATATION CATHETER 3.00 MM X 20 MM / RAPID-EXCHANGE: Brand: NC TREK NEO™

## (undated) DEVICE — CATH PACE PACEL BIPOL 5F110CM

## (undated) DEVICE — SLV REPOSTNG CATH STRL 60CM

## (undated) DEVICE — Device: Brand: OMNIWIRE PRESSURE GUIDE WIRE

## (undated) DEVICE — ST INF PRI SMRTSTE 20DRP 2VLV 24ML 117

## (undated) DEVICE — MODEL AT P65, P/N 701554-001KIT CONTENTS: HAND CONTROLLER, 3-WAY HIGH-PRESSURE STOPCOCK WITH ROTATING END AND PREMIUM HIGH-PRESSURE TUBING: Brand: ANGIOTOUCH® KIT

## (undated) DEVICE — CABL PACE ATRIAL PT BLU

## (undated) DEVICE — MODEL BT2000 P/N 700287-012KIT CONTENTS: MANIFOLD WITH SALINE AND CONTRAST PORTS, SALINE TUBING WITH SPIKE AND HAND SYRINGE, TRANSDUCER: Brand: BT2000 AUTOMATED MANIFOLD KIT